# Patient Record
Sex: MALE | Race: WHITE | NOT HISPANIC OR LATINO | URBAN - METROPOLITAN AREA
[De-identification: names, ages, dates, MRNs, and addresses within clinical notes are randomized per-mention and may not be internally consistent; named-entity substitution may affect disease eponyms.]

---

## 2017-12-02 ENCOUNTER — INPATIENT (INPATIENT)
Facility: HOSPITAL | Age: 82
LOS: 2 days | Discharge: HOME | DRG: 375 | End: 2017-12-05
Attending: INTERNAL MEDICINE | Admitting: HOSPITALIST
Payer: MEDICARE

## 2017-12-02 VITALS
HEART RATE: 76 BPM | TEMPERATURE: 99 F | DIASTOLIC BLOOD PRESSURE: 78 MMHG | RESPIRATION RATE: 15 BRPM | SYSTOLIC BLOOD PRESSURE: 128 MMHG | OXYGEN SATURATION: 96 %

## 2017-12-02 DIAGNOSIS — F10.20 ALCOHOL DEPENDENCE, UNCOMPLICATED: ICD-10-CM

## 2017-12-02 DIAGNOSIS — F41.9 ANXIETY DISORDER, UNSPECIFIED: ICD-10-CM

## 2017-12-02 DIAGNOSIS — N17.9 ACUTE KIDNEY FAILURE, UNSPECIFIED: ICD-10-CM

## 2017-12-02 DIAGNOSIS — N40.0 BENIGN PROSTATIC HYPERPLASIA WITHOUT LOWER URINARY TRACT SYMPTOMS: ICD-10-CM

## 2017-12-02 DIAGNOSIS — D64.9 ANEMIA, UNSPECIFIED: ICD-10-CM

## 2017-12-02 DIAGNOSIS — I25.10 ATHEROSCLEROTIC HEART DISEASE OF NATIVE CORONARY ARTERY WITHOUT ANGINA PECTORIS: ICD-10-CM

## 2017-12-02 DIAGNOSIS — Z29.9 ENCOUNTER FOR PROPHYLACTIC MEASURES, UNSPECIFIED: ICD-10-CM

## 2017-12-02 DIAGNOSIS — I10 ESSENTIAL (PRIMARY) HYPERTENSION: ICD-10-CM

## 2017-12-02 DIAGNOSIS — E78.5 HYPERLIPIDEMIA, UNSPECIFIED: ICD-10-CM

## 2017-12-02 DIAGNOSIS — D72.829 ELEVATED WHITE BLOOD CELL COUNT, UNSPECIFIED: ICD-10-CM

## 2017-12-02 LAB
ABO RH CONFIRMATION: SIGNIFICANT CHANGE UP
ALBUMIN SERPL ELPH-MCNC: 3.2 G/DL — LOW (ref 3.3–5)
ALP SERPL-CCNC: 52 U/L — SIGNIFICANT CHANGE UP (ref 40–120)
ALT FLD-CCNC: 17 U/L — SIGNIFICANT CHANGE UP (ref 12–78)
ANION GAP SERPL CALC-SCNC: 10 MMOL/L — SIGNIFICANT CHANGE UP (ref 5–17)
APPEARANCE UR: CLEAR — SIGNIFICANT CHANGE UP
APTT BLD: 25.3 SEC — LOW (ref 27.5–37.4)
AST SERPL-CCNC: 33 U/L — SIGNIFICANT CHANGE UP (ref 15–37)
BILIRUB SERPL-MCNC: 0.3 MG/DL — SIGNIFICANT CHANGE UP (ref 0.2–1.2)
BILIRUB UR-MCNC: ABNORMAL
BUN SERPL-MCNC: 76 MG/DL — HIGH (ref 7–23)
CALCIUM SERPL-MCNC: 8.6 MG/DL — SIGNIFICANT CHANGE UP (ref 8.5–10.1)
CHLORIDE SERPL-SCNC: 104 MMOL/L — SIGNIFICANT CHANGE UP (ref 96–108)
CO2 SERPL-SCNC: 23 MMOL/L — SIGNIFICANT CHANGE UP (ref 22–31)
COLOR SPEC: YELLOW — SIGNIFICANT CHANGE UP
CREAT SERPL-MCNC: 2.7 MG/DL — HIGH (ref 0.5–1.3)
DIFF PNL FLD: ABNORMAL
GLUCOSE SERPL-MCNC: 108 MG/DL — HIGH (ref 70–99)
GLUCOSE UR QL: NEGATIVE — SIGNIFICANT CHANGE UP
HCT VFR BLD CALC: 22.3 % — LOW (ref 39–50)
HGB BLD-MCNC: 6.9 G/DL — CRITICAL LOW (ref 13–17)
INR BLD: 1.05 RATIO — SIGNIFICANT CHANGE UP (ref 0.88–1.16)
KETONES UR-MCNC: NEGATIVE — SIGNIFICANT CHANGE UP
LEUKOCYTE ESTERASE UR-ACNC: NEGATIVE — SIGNIFICANT CHANGE UP
LYMPHOCYTES # BLD AUTO: 14 % — SIGNIFICANT CHANGE UP (ref 13–44)
MCHC RBC-ENTMCNC: 30.5 PG — SIGNIFICANT CHANGE UP (ref 27–34)
MCHC RBC-ENTMCNC: 31 GM/DL — LOW (ref 32–36)
MCV RBC AUTO: 98.3 FL — SIGNIFICANT CHANGE UP (ref 80–100)
MONOCYTES NFR BLD AUTO: 9 % — SIGNIFICANT CHANGE UP (ref 1–9)
NEUTROPHILS NFR BLD AUTO: 77 % — SIGNIFICANT CHANGE UP (ref 43–77)
NITRITE UR-MCNC: NEGATIVE — SIGNIFICANT CHANGE UP
OB PNL STL: POSITIVE
PH UR: 5 — SIGNIFICANT CHANGE UP (ref 5–8)
PLATELET # BLD AUTO: 232 K/UL — SIGNIFICANT CHANGE UP (ref 150–400)
POTASSIUM SERPL-MCNC: 4.1 MMOL/L — SIGNIFICANT CHANGE UP (ref 3.5–5.3)
POTASSIUM SERPL-SCNC: 4.1 MMOL/L — SIGNIFICANT CHANGE UP (ref 3.5–5.3)
PROT SERPL-MCNC: 6.4 G/DL — SIGNIFICANT CHANGE UP (ref 6–8.3)
PROT UR-MCNC: 25 MG/DL
PROTHROM AB SERPL-ACNC: 11.5 SEC — SIGNIFICANT CHANGE UP (ref 9.8–12.7)
RBC # BLD: 2.27 M/UL — LOW (ref 4.2–5.8)
RBC # FLD: 17.5 % — HIGH (ref 10.3–14.5)
SODIUM SERPL-SCNC: 137 MMOL/L — SIGNIFICANT CHANGE UP (ref 135–145)
SP GR SPEC: 1.02 — SIGNIFICANT CHANGE UP (ref 1.01–1.02)
UROBILINOGEN FLD QL: NEGATIVE — SIGNIFICANT CHANGE UP
WBC # BLD: 14.4 K/UL — HIGH (ref 3.8–10.5)
WBC # FLD AUTO: 14.4 K/UL — HIGH (ref 3.8–10.5)

## 2017-12-02 PROCEDURE — 70450 CT HEAD/BRAIN W/O DYE: CPT | Mod: 26

## 2017-12-02 PROCEDURE — 99223 1ST HOSP IP/OBS HIGH 75: CPT | Mod: AI,GC

## 2017-12-02 PROCEDURE — 99285 EMERGENCY DEPT VISIT HI MDM: CPT

## 2017-12-02 PROCEDURE — 93010 ELECTROCARDIOGRAM REPORT: CPT

## 2017-12-02 PROCEDURE — 74176 CT ABD & PELVIS W/O CONTRAST: CPT | Mod: 26

## 2017-12-02 PROCEDURE — 71010: CPT | Mod: 26

## 2017-12-02 RX ORDER — FERROUS SULFATE 325(65) MG
0 TABLET ORAL
Qty: 0 | Refills: 0 | COMMUNITY

## 2017-12-02 RX ORDER — PREGABALIN 225 MG/1
1 CAPSULE ORAL
Qty: 0 | Refills: 0 | COMMUNITY

## 2017-12-02 RX ORDER — FERROUS SULFATE 325(65) MG
1 TABLET ORAL
Qty: 0 | Refills: 0 | COMMUNITY

## 2017-12-02 RX ORDER — DOXAZOSIN MESYLATE 4 MG
4 TABLET ORAL AT BEDTIME
Qty: 0 | Refills: 0 | Status: DISCONTINUED | OUTPATIENT
Start: 2017-12-02 | End: 2017-12-05

## 2017-12-02 RX ORDER — MIRTAZAPINE 45 MG/1
1 TABLET, ORALLY DISINTEGRATING ORAL
Qty: 0 | Refills: 0 | COMMUNITY

## 2017-12-02 RX ORDER — FERROUS SULFATE 325(65) MG
325 TABLET ORAL DAILY
Qty: 0 | Refills: 0 | Status: DISCONTINUED | OUTPATIENT
Start: 2017-12-02 | End: 2017-12-05

## 2017-12-02 RX ORDER — PREGABALIN 225 MG/1
100 CAPSULE ORAL DAILY
Qty: 0 | Refills: 0 | Status: DISCONTINUED | OUTPATIENT
Start: 2017-12-02 | End: 2017-12-05

## 2017-12-02 RX ORDER — LISINOPRIL 2.5 MG/1
1 TABLET ORAL
Qty: 0 | Refills: 0 | COMMUNITY

## 2017-12-02 RX ORDER — SIMVASTATIN 20 MG/1
20 TABLET, FILM COATED ORAL AT BEDTIME
Qty: 0 | Refills: 0 | Status: DISCONTINUED | OUTPATIENT
Start: 2017-12-02 | End: 2017-12-05

## 2017-12-02 RX ORDER — CHOLECALCIFEROL (VITAMIN D3) 125 MCG
1 CAPSULE ORAL
Qty: 0 | Refills: 0 | COMMUNITY

## 2017-12-02 RX ORDER — MIRTAZAPINE 45 MG/1
0 TABLET, ORALLY DISINTEGRATING ORAL
Qty: 0 | Refills: 0 | COMMUNITY

## 2017-12-02 RX ORDER — SODIUM CHLORIDE 9 MG/ML
1000 INJECTION INTRAMUSCULAR; INTRAVENOUS; SUBCUTANEOUS ONCE
Qty: 0 | Refills: 0 | Status: COMPLETED | OUTPATIENT
Start: 2017-12-02 | End: 2017-12-02

## 2017-12-02 RX ORDER — MIRTAZAPINE 45 MG/1
15 TABLET, ORALLY DISINTEGRATING ORAL AT BEDTIME
Qty: 0 | Refills: 0 | Status: DISCONTINUED | OUTPATIENT
Start: 2017-12-02 | End: 2017-12-05

## 2017-12-02 RX ORDER — CHOLECALCIFEROL (VITAMIN D3) 125 MCG
400 CAPSULE ORAL DAILY
Qty: 0 | Refills: 0 | Status: DISCONTINUED | OUTPATIENT
Start: 2017-12-02 | End: 2017-12-05

## 2017-12-02 RX ADMIN — Medication 4 MILLIGRAM(S): at 22:43

## 2017-12-02 RX ADMIN — SIMVASTATIN 20 MILLIGRAM(S): 20 TABLET, FILM COATED ORAL at 22:43

## 2017-12-02 RX ADMIN — MIRTAZAPINE 15 MILLIGRAM(S): 45 TABLET, ORALLY DISINTEGRATING ORAL at 22:43

## 2017-12-02 RX ADMIN — SODIUM CHLORIDE 1000 MILLILITER(S): 9 INJECTION INTRAMUSCULAR; INTRAVENOUS; SUBCUTANEOUS at 17:11

## 2017-12-02 NOTE — H&P ADULT - PROBLEM SELECTOR PLAN 2
Possibly secondary to UTI vs prostatitis, patients son admits to patient with increased urinary frequency and a history of prostatitis  Patient is afebrile, UA negative.  Will monitor WBC off antibiotics.  Consider antibiotics if patient spikes temperature.   Follow up Urine culture and blood cultures x 2. Possibly secondary to UTI vs prostatitis, patients son admits to patient with increased urinary frequency and a history of prostatitis  Patient is afebrile, UA negative.  Will monitor WBC off antibiotics.  Consider antibiotics if patient spikes temperature.   Follow up Urine culture

## 2017-12-02 NOTE — H&P ADULT - PROBLEM SELECTOR PLAN 3
Possibly secondary to dehydration and acute blood loss  s/p 1 L NS in Ed  To receive 2 units PRBC  Will hold ACE inhibitor. Hold renally toxic medications.  Follow up renal function in AM. Possibly secondary to dehydration and acute blood loss  s/p 1 L NS in ED  To receive 2 units PRBC  Will hold ACE inhibitor. Hold renally toxic medications.  Follow up renal function in AM.

## 2017-12-02 NOTE — H&P ADULT - PROBLEM SELECTOR PLAN 8
Will hold chemical AC in the setting of acute GI bleed  SCDs bilaterally for now    IMPROVE VTE Individual Risk Assessment          RISK                                                          Points  [  ] Previous VTE                                                3  [x  ] Thrombophilia                                             2  [  ] Lower limb paralysis                                   2        (unable to hold up >15 seconds)    [  ] Current Cancer                                             2         (within 6 months)  [  ] Immobilization > 24 hrs                              1  [  ] ICU/CCU stay > 24 hours                             1  [  x] Age > 60                                                         1    IMPROVE VTE Score: 3 Continue terazosin

## 2017-12-02 NOTE — H&P ADULT - PROBLEM SELECTOR PLAN 5
Hold ACE inhibitor secondary to WILLARD  Monitor BP off medications for now  Cardiology consult Dr. Millard Hold ACE inhibitor secondary to WILLARD  BP stable. Monitor BP off medications for now  Cardiology consult Dr. Millard

## 2017-12-02 NOTE — H&P ADULT - PROBLEM SELECTOR PLAN 10
Will hold chemical AC in the setting of acute GI bleed  SCDs bilaterally for now    IMPROVE VTE Individual Risk Assessment          RISK                                                          Points  [  ] Previous VTE                                                3  [x  ] Thrombophilia                                             2  [  ] Lower limb paralysis                                   2        (unable to hold up >15 seconds)    [  ] Current Cancer                                             2         (within 6 months)  [  ] Immobilization > 24 hrs                              1  [  ] ICU/CCU stay > 24 hours                             1  [  x] Age > 60                                                         1    IMPROVE VTE Score: 3

## 2017-12-02 NOTE — ED ADULT NURSE NOTE - OBJECTIVE STATEMENT
received pt in bed #9 Pt A&O c/o weakness "feeling shaky" & urine  frequency over the last 2 months. Pts son states he was dx w/ anemia x 1 month & started on iron pills. Pt seen by Dr Ball Pt resting Will monitor received pt in bed #9 Pt A&O confused to time c/o weakness "feeling shaky" & urine  frequency over the last 2 months. Pts son states he was dx w/ anemia x 1 month & started on iron pills. Pt seen by Dr Ball Pt resting Will monitor

## 2017-12-02 NOTE — H&P ADULT - PROBLEM SELECTOR PLAN 1
Anemia likely secondary to Acute GI bleed, with FOBT +  Admit to MED/SURG with remote telemetry  Patient to receive 2 units PRBC  Repeat CBC 1 hour after second unit completed  Follow up AM labs  GI consult Dr. Martínez  Cardiology Consult Dr. Millard  NPO except medications  Strict bedrest  Fall precautions Anemia likely secondary to Acute GI bleed, with FOBT +  Admit to MED/SURG with remote telemetry  Patient to receive 2 units PRBC  Repeat CBC 1 hour after second unit completed  Patient currently hemodynamically stable, potential colonoscopy pending discussion with GI and patients family to observe for source of GI bleed.  Follow up AM labs  GI consult Dr. Martínez  Cardiology Consult Dr. Millard  NPO except medications  Strict bedrest  Fall precautions Anemia likely secondary to Acute GI bleed, with FOBT +  Admit to MED/SURG with remote telemetry  Patient to receive 2 units PRBC  Continue iron, vitamin B12  Repeat CBC 1 hour after second unit completed  Patient currently hemodynamically stable, potential colonoscopy pending discussion with GI and patients family to observe for source of GI bleed.  CT Abdomen/Pelvis without contrast  Follow up AM labs, iron, vitamin B12, folate  GI consult Dr. Martínez  Cardiology Consult Dr. Millard  NPO except medications  Strict bedrest  Fall precautions

## 2017-12-02 NOTE — ED PROVIDER NOTE - OBJECTIVE STATEMENT
89 yo male with hx htn, hld, cad with increasing weakness and lack of energy. +frequent falls. As per son, patient has been deteriorating and unable to take care of himself. Patient recently told he was anemic, and started on iron supplementation. no known GI bleeding. had endoscopy and needs colonoscopy. denies fever or chills. also with urinary frequency.   no hx of tranfusion  pmd Constantino Martínez

## 2017-12-02 NOTE — H&P ADULT - PMH
Anxiety    CAD (coronary artery disease)    Colon cancer    Hyperlipidemia    Hypertension Anxiety    BPH (benign prostatic hyperplasia)    CAD (coronary artery disease)    Colon cancer    Hyperlipidemia    Hypertension

## 2017-12-02 NOTE — ED ADULT NURSE NOTE - PSYCHOSOCIAL WDL
Alert and oriented x 3, normal mood and affect, no apparent risk to self or others. Alert and oriented  normal mood and affect, no apparent risk to self or others.

## 2017-12-02 NOTE — H&P ADULT - PROBLEM SELECTOR PLAN 7
Hold ASA/Plavix secondary to acute GI bleed  Cardiology consult Dr. Millard Hold ASA/Plavix secondary to acute GI bleed  Continue statin  Cardiology consult Dr. Millard

## 2017-12-02 NOTE — H&P ADULT - ASSESSMENT
Patient is a 89 year old male with PMH HTN, HLD, Anemia, CAD s/p 3 stents, history of colon cancer s/p resection 50 years ago (last colonoscopy about 10 years ago), anxiety, presents to the ED with increasing weakness and lack of energy, and +frequent falls, admit for Anemia secondary to acute GI Bleed, Leukocytosis, WILLARD, and Alcoholism. Patient is a 89 year old male with PMH HTN, HLD, Anemia, CAD s/p 3 stents, BPH, history of colon cancer s/p resection 50 years ago (last colonoscopy about 10 years ago), anxiety, presents to the ED with increasing weakness and lack of energy, and +frequent falls, admit for Anemia secondary to acute GI Bleed, Leukocytosis, WILLARD, and Alcoholism.

## 2017-12-02 NOTE — H&P ADULT - HISTORY OF PRESENT ILLNESS
Patient is a 89 year old male with PMH HTN, HLD, Anemia, CAD s/p 3 stents, history of colon cancer s/p resection 50 years ago (last colonoscopy about 10 years ago), anxiety, presents to the ED with increasing weakness and lack of energy, and +frequent falls. As per son, patient has been deteriorating and unable to take care of himself. In the last two months, patient was being worked up for anemia by his PCP and GI Dr. Martínez. He was started on iron, counts improved. He had an endoscopy which was negative. Decision was made by family and doctors to hold off on colonoscopy as patient was improving. Now in the last three days, patient was noted to be weak, confused, not eating, increased frequency of urination, and had an episode of defecating himself without knowing. These symptoms are all new for the patient as per the son. Son reports in the past the patient has had prostatitis. All of history was obtained by the son as patient is confused. Note the son is patients health care proxy and the patient is DNR/DNI as per the son. Patients son also reports that the patient is probably considered an alcoholic, due to daily 1-2 glasses of vodka consumption for many years, this is still current today.    In ED, patients vitals were stable. Labs significant for WBC 14.4, H/H 6.9/22.3, Cr 2.70. FOBT positive. CT Head negative. CXR negative for consolidation or effusion. UA negative. EKG positive for PPM, sinus with fusion complexes, LAD, inferior infarct age indeterminate, anteriolateral infarct age indeterminate, VR 72. Patient received 1 L NS, was type and screened, and was ordered for 2 units PRBC. Patient is a 89 year old male with PMH HTN, HLD, Anemia, CAD s/p 3 stents, BPH, history of colon cancer s/p resection 50 years ago (last colonoscopy about 10 years ago), anxiety, presents to the ED with increasing weakness and lack of energy, and +frequent falls. As per son, patient has been deteriorating and unable to take care of himself. In the last two months, patient was being worked up for anemia by his PCP and GI Dr. Martínez. He was started on iron, counts improved. He had an endoscopy which was negative. Decision was made by family and doctors to hold off on colonoscopy as patient was improving. Now in the last three days, patient was noted to be weak, confused, not eating, increased frequency of urination, and had an episode of defecating himself without knowing. These symptoms are all new for the patient as per the son. Son reports in the past the patient has had prostatitis. All of history was obtained by the son as patient is confused. Note the son is patients health care proxy and the patient is DNR/DNI as per the son. Patients son also reports that the patient is probably considered an alcoholic, due to daily 1-2 glasses of vodka consumption for many years, this is still current today.    In ED, patients vitals were stable. Labs significant for WBC 14.4, H/H 6.9/22.3, Cr 2.70. FOBT positive. CT Head negative. CXR negative for consolidation or effusion. UA negative. EKG positive for PPM, sinus with fusion complexes, LAD, inferior infarct age indeterminate, anteriolateral infarct age indeterminate, VR 72. Patient received 1 L NS, was type and screened, and was ordered for 2 units PRBC.

## 2017-12-02 NOTE — H&P ADULT - ATTENDING COMMENTS
Patient is a 89 year old male with PMH HTN, HLD, Anemia, CAD s/p 3 stents, BPH, history of colon cancer s/p resection 50 years ago (last colonoscopy about 10 years ago), anxiety admit for Anemia secondary to acute GI Bleed, Leukocytosis, WILLARD, and Alcoholism.  2 units ordered.  Monitor on remote tele.  Tele floor was full.  Leukocytosis unclear etiology, no fever, assymptomatic.  F/U urine cx and monitor for fever.  No abx for now.  IVF for willard. CIWA protocol in case pt goes through withdrawal.  CT abd.pelv ordered. Patient is a 89 year old male with PMH HTN, HLD, Anemia, CAD s/p 3 stents, BPH, history of colon cancer s/p resection 50 years ago (last colonoscopy about 10 years ago), anxiety admit for Anemia secondary to acute GI Bleed, Leukocytosis, WILLARD, and Alcoholism.  2 units ordered.  Monitor on remote tele.  Tele floor was full.  Leukocytosis unclear etiology, no fever, assymptomatic.  F/U urine cx and monitor for fever.  No abx for now.  IVF for willard. CIWA protocol in case pt goes through withdrawal.  CT abd.pelv ordered.    After speaking with health proxy the son, he would like patient to be DNR.

## 2017-12-02 NOTE — H&P ADULT - NSHPSOCIALHISTORY_GEN_ALL_CORE
Social History/Preventive Medicine:     Marital Status:   Occupation: retired  Lives with: wife  Ambulates at home: yes    Substance Use: no  Tobacco Usage:  no  Alcohol Usage: daily vodka drinker, 1-2 glasses per day  Illicit Drug Usage: no    Health Management     Last Physical Exam: 3 weeks ago    Advanced Directives: DNR/DNI

## 2017-12-02 NOTE — H&P ADULT - NSHPPHYSICALEXAM_GEN_ALL_CORE
Physical Exam:  General: Well developed, appears thin, smells of urine, NAD  HEENT: NCAT, PERRLA, EOMI bl, moist mucous membranes   Neck: Supple, nontender, no mass  Neurology: Alert to person and place, nonfocal, CN II-XII grossly intact, sensation intact  Respiratory: CTA B/L, No W/R/R  CV: RRR, +S1/S2, no murmurs, rubs or gallops  Abdominal: Soft, NT, + distended abdomen, +BS in RLQ  Extremities: No C/C/E, + peripheral pulses  MSK: Normal ROM, no joint erythema or warmth, no joint swelling   Skin: warm, dry, normal color, no rash or abnormal lesions

## 2017-12-03 DIAGNOSIS — K92.2 GASTROINTESTINAL HEMORRHAGE, UNSPECIFIED: ICD-10-CM

## 2017-12-03 LAB
ALBUMIN SERPL ELPH-MCNC: 2.8 G/DL — LOW (ref 3.3–5)
ALP SERPL-CCNC: 51 U/L — SIGNIFICANT CHANGE UP (ref 40–120)
ALT FLD-CCNC: 18 U/L — SIGNIFICANT CHANGE UP (ref 12–78)
ANION GAP SERPL CALC-SCNC: 8 MMOL/L — SIGNIFICANT CHANGE UP (ref 5–17)
AST SERPL-CCNC: 30 U/L — SIGNIFICANT CHANGE UP (ref 15–37)
BILIRUB SERPL-MCNC: 0.5 MG/DL — SIGNIFICANT CHANGE UP (ref 0.2–1.2)
BUN SERPL-MCNC: 54 MG/DL — HIGH (ref 7–23)
CALCIUM SERPL-MCNC: 8.2 MG/DL — LOW (ref 8.5–10.1)
CHLORIDE SERPL-SCNC: 113 MMOL/L — HIGH (ref 96–108)
CO2 SERPL-SCNC: 23 MMOL/L — SIGNIFICANT CHANGE UP (ref 22–31)
CREAT SERPL-MCNC: 1.6 MG/DL — HIGH (ref 0.5–1.3)
CULTURE RESULTS: SIGNIFICANT CHANGE UP
FOLATE SERPL-MCNC: 11.6 NG/ML — SIGNIFICANT CHANGE UP (ref 4.8–24.2)
GLUCOSE SERPL-MCNC: 94 MG/DL — SIGNIFICANT CHANGE UP (ref 70–99)
HCT VFR BLD CALC: 27.6 % — LOW (ref 39–50)
HCT VFR BLD CALC: 28.5 % — LOW (ref 39–50)
HGB BLD-MCNC: 8.9 G/DL — LOW (ref 13–17)
HGB BLD-MCNC: 8.9 G/DL — LOW (ref 13–17)
IRON SATN MFR SERPL: 13 % — LOW (ref 16–55)
IRON SATN MFR SERPL: 30 UG/DL — LOW (ref 45–165)
MCHC RBC-ENTMCNC: 29.9 PG — SIGNIFICANT CHANGE UP (ref 27–34)
MCHC RBC-ENTMCNC: 31.3 GM/DL — LOW (ref 32–36)
MCV RBC AUTO: 95.4 FL — SIGNIFICANT CHANGE UP (ref 80–100)
PLATELET # BLD AUTO: 214 K/UL — SIGNIFICANT CHANGE UP (ref 150–400)
POTASSIUM SERPL-MCNC: 4.3 MMOL/L — SIGNIFICANT CHANGE UP (ref 3.5–5.3)
POTASSIUM SERPL-SCNC: 4.3 MMOL/L — SIGNIFICANT CHANGE UP (ref 3.5–5.3)
PROT SERPL-MCNC: 5.7 G/DL — LOW (ref 6–8.3)
RBC # BLD: 2.98 M/UL — LOW (ref 4.2–5.8)
RBC # FLD: 17.5 % — HIGH (ref 10.3–14.5)
SODIUM SERPL-SCNC: 144 MMOL/L — SIGNIFICANT CHANGE UP (ref 135–145)
SPECIMEN SOURCE: SIGNIFICANT CHANGE UP
TIBC SERPL-MCNC: 223 UG/DL — SIGNIFICANT CHANGE UP (ref 220–430)
UIBC SERPL-MCNC: 193 UG/DL — SIGNIFICANT CHANGE UP (ref 110–370)
VIT B12 SERPL-MCNC: 1026 PG/ML — HIGH (ref 243–894)
WBC # BLD: 9.2 K/UL — SIGNIFICANT CHANGE UP (ref 3.8–10.5)
WBC # FLD AUTO: 9.2 K/UL — SIGNIFICANT CHANGE UP (ref 3.8–10.5)

## 2017-12-03 PROCEDURE — 99233 SBSQ HOSP IP/OBS HIGH 50: CPT

## 2017-12-03 PROCEDURE — 99223 1ST HOSP IP/OBS HIGH 75: CPT

## 2017-12-03 RX ADMIN — Medication 325 MILLIGRAM(S): at 12:45

## 2017-12-03 RX ADMIN — SIMVASTATIN 20 MILLIGRAM(S): 20 TABLET, FILM COATED ORAL at 21:28

## 2017-12-03 RX ADMIN — PREGABALIN 100 MICROGRAM(S): 225 CAPSULE ORAL at 12:45

## 2017-12-03 RX ADMIN — Medication 4 MILLIGRAM(S): at 21:28

## 2017-12-03 RX ADMIN — Medication 400 UNIT(S): at 12:45

## 2017-12-03 RX ADMIN — Medication 20 MILLIGRAM(S): at 12:45

## 2017-12-03 RX ADMIN — Medication 5 MILLIGRAM(S): at 12:45

## 2017-12-03 RX ADMIN — MIRTAZAPINE 15 MILLIGRAM(S): 45 TABLET, ORALLY DISINTEGRATING ORAL at 21:29

## 2017-12-03 NOTE — CONSULT NOTE ADULT - ASSESSMENT
89 year old male with PMH HTN, HLD, Anemia, CAD s/p 3 stents (unclear when), ppm (unclear which  and reason for implant), BPH, history of colon cancer s/p resection 50 years ago (last colonoscopy about 10 years ago), anxiety.  He presented to the ED with increasing weakness and frequent falls. As per son, patient had been deteriorating and unable to take care of himself. In the last two months, patient was being worked up for anemia by his PCP and GI. He was started on iron, counts improved. He had an endoscopy which was negative. Decision was made by family and doctors to hold off on colonoscopy as patient was improving. Now in the last three days, patient was noted to be weak, confused, not eating, increased frequency of urination, and had an episode of defecating himself without knowing. These symptoms are all new for the patient as per the son. Son reports in the past the patient has had prostatitis.     Patients son also reports that the patient is probably considered an alcoholic, due to daily 1-2 glasses of vodka consumption for many years, which is ongoing.    In ED, patients vitals were stable. Labs significant for WBC 14.4, H/H 6.9/22.3, Cr 2.70. FOBT positive. CT Head negative. CXR negative for consolidation or effusion. UA negative. EKG with paced rhythm.  Was transfused two units overnight.    -there is no evidence of acute ischemia.  -pci in the past, unclear when  -his meds list that he has been taking asa and plavix, suggesting a parminder at some point, perhaps recent  -if the stents were put in very recently he may require dapt despite bleeding	  -ideally would remain on at least asa, though clearly, with life threatening anemia, we need to carefully weigh risks and benefits  -cont statin  -there is no evidence of significant arrhythmia.  - on ekg and tele  -there is no evidence for meaningful  volume overload.  -DVT prophylaxis  -monitor electrolytes, keep k>4, Mg>2  -gi eval  -a conservative approach may be best to manage his gib depending upon information to be obtained from family, his prognosis, and his clinical course  -will follow
Chronic GI Blood loss. Probably from a colonic source. ?Sami, AVM  Family has refused colonoscopy in the past. Will need to update their decision.

## 2017-12-03 NOTE — PROGRESS NOTE ADULT - ASSESSMENT
89 year old male with PMH HTN, HLD, Anemia, CAD s/p 3 stents, BPH, history of colon cancer s/p resection 50 years ago (last colonoscopy about 10 years ago), anxiety, presents to the ED with increasing weakness and lack of energy, and +frequent falls, admit for Anemia secondary to acute GI Bleed, Leukocytosis, WILLARD, and Alcoholism.

## 2017-12-03 NOTE — CONSULT NOTE ADULT - SUBJECTIVE AND OBJECTIVE BOX
[facility]         Carlos Feliz, Samantha, Aaron, Shayna, Freeman, Marcos        [phone] (office)    CHIEF COMPLAINT: Patient is a 89y old  Male who presents with a chief complaint of anemia (02 Dec 2017 19:13)      HPI: The patient is unable to provide a history given his dementia, and his son is unavailable by phone at listed contact number.  History from the medical record.    He is a 89 year old male with PMH HTN, HLD, Anemia, CAD s/p 3 stents (unclear when), ppm (unclear which  and reason for implant), BPH, history of colon cancer s/p resection 50 years ago (last colonoscopy about 10 years ago), anxiety.  He presented to the ED with increasing weakness and frequent falls. As per son, patient had been deteriorating and unable to take care of himself. In the last two months, patient was being worked up for anemia by his PCP and GI. He was started on iron, counts improved. He had an endoscopy which was negative. Decision was made by family and doctors to hold off on colonoscopy as patient was improving. Now in the last three days, patient was noted to be weak, confused, not eating, increased frequency of urination, and had an episode of defecating himself without knowing. These symptoms are all new for the patient as per the son. Son reports in the past the patient has had prostatitis.     Patients son also reports that the patient is probably considered an alcoholic, due to daily 1-2 glasses of vodka consumption for many years, which is ongoing.    In ED, patients vitals were stable. Labs significant for WBC 14.4, H/H 6.9/22.3, Cr 2.70. FOBT positive. CT Head negative. CXR negative for consolidation or effusion. UA negative. EKG with paced rhythm.  Was transfused two units overnight.    Today he is uncertain of his medical history and does not know why he is in the hospital.    PAST MEDICAL & SURGICAL HISTORY:  BPH (benign prostatic hyperplasia)  Anxiety  Colon cancer  CAD (coronary artery disease)  Hyperlipidemia  Hypertension  S/P colon resection  S/P appendectomy  H/O angioplasty: with 3 stents  Pacemaker      SOCIAL HISTORY:  alcohol use as described above    FAMILY HISTORY:  No pertinent family history in first degree relatives      Outpatient medications:    MEDICATIONS  (STANDING):  busPIRone 5 milliGRAM(s) Oral daily  cholecalciferol 400 Unit(s) Oral daily  cyanocobalamin 100 MICROGram(s) Oral daily  doxazosin 4 milliGRAM(s) Oral at bedtime  ferrous    sulfate 325 milliGRAM(s) Oral daily  mirtazapine 15 milliGRAM(s) Oral at bedtime  PARoxetine 20 milliGRAM(s) Oral daily  simvastatin 20 milliGRAM(s) Oral at bedtime    MEDICATIONS  (PRN):      Allergies    sulfa drugs (Headache)    Intolerances        REVIEW OF SYSTEMS: Is negative for eye, ENT, GI, , allergic, dermatologic, musculoskeletal and neurologic, except as described above.    VITAL SIGNS:   Vital Signs Last 24 Hrs  T(C): 36.7 (03 Dec 2017 06:45), Max: 37 (02 Dec 2017 16:08)  T(F): 98 (03 Dec 2017 06:45), Max: 98.6 (02 Dec 2017 16:08)  HR: 70 (03 Dec 2017 06:45) (70 - 76)  BP: 168/73 (03 Dec 2017 06:45) (128/78 - 168/73)  BP(mean): --  RR: 17 (03 Dec 2017 06:45) (15 - 17)  SpO2: 100% (03 Dec 2017 06:45) (96% - 100%)    I&O's Summary    02 Dec 2017 07:01  -  03 Dec 2017 07:00  --------------------------------------------------------  IN: 481 mL / OUT: 850 mL / NET: -369 mL        PHYSICAL EXAM:    Constitutional: NAD, awake and alert, well-developed  Eyes:  EOMI, no oral cyanosis, conjunctivae clear, anicteric.  Pulmonary: Non-labored, breath sounds are clear bilaterally, no wheezing, rales or rhonchi  Cardiovascular:  regular S1 and S2. No murmur.  No rubs, gallops or clicks  Gastrointestinal: Bowel Sounds present, soft, nontender.   Lymph: No peripheral edema.   Neurological: Alert, strength and sensitivity are grossly intact  Skin: No obvious lesions/rashes.   Psych:  Mood & affect appropriate .    LABS: All Labs Reviewed:                        8.9    x     )-----------( x        ( 03 Dec 2017 02:59 )             27.6                         6.9    14.4  )-----------( 232      ( 02 Dec 2017 16:55 )             22.3     02 Dec 2017 16:55    137    |  104    |  76     ----------------------------<  108    4.1     |  23     |  2.70     Ca    8.6        02 Dec 2017 16:55    TPro  6.4    /  Alb  3.2    /  TBili  0.3    /  DBili  x      /  AST  33     /  ALT  17     /  AlkPhos  52     02 Dec 2017 16:55    PT/INR - ( 02 Dec 2017 16:55 )   PT: 11.5 sec;   INR: 1.05 ratio         PTT - ( 02 Dec 2017 16:55 )  PTT:25.3 sec      Blood Culture:         RADIOLOGY:    < from: Xray Chest 1 View AP- PORTABLE-Urgent (12.02.17 @ 17:32) >    EXAM:  PORTABLE CHEST URGENT                            PROCEDURE DATE:  12/02/2017          INTERPRETATION:  History: Weakness    Portable AP radiograph of the chest is performed. There are no prior   studies for comparison.    Carding mediastinalleft-sided dual-lead pacemaker is noted. There is no   infiltrate or pleural effusion. There is osteopenia of the bony   structures.    Impression: Pacemaker. No active pulmonary disease.                JADEN GARCIA M.D.,ATTENDING RADIOLOGIST  This document has been electronically signed. Dec  2 2017  5:42PM                < end of copied text >      EKG: 
Chief Complaint:  Patient is a 89y old  Male who presents with a chief complaint of anemia (02 Dec 2017 19:13)      HPI: 89 yr old man well known to me from the office. Has a history of a malignant polyp s/p colonic resection. Most recently, has been evaluated for anemia. He underwent an EGD which was positive for gastritis, but was otherwise negative. The family has refused a colonoscopy.    He is admitted now with significant anemia. He denies any symptoms suggesting GI blood loss. He denies abdominal pain, nausea, vomiting, weight loss or loss of appetite. There have been no fever or chills.     Allergies:  sulfa drugs (Headache)      Medications:  busPIRone 5 milliGRAM(s) Oral daily  cholecalciferol 400 Unit(s) Oral daily  cyanocobalamin 100 MICROGram(s) Oral daily  doxazosin 4 milliGRAM(s) Oral at bedtime  ferrous    sulfate 325 milliGRAM(s) Oral daily  mirtazapine 15 milliGRAM(s) Oral at bedtime  PARoxetine 20 milliGRAM(s) Oral daily  simvastatin 20 milliGRAM(s) Oral at bedtime      PMHX/PSHX:  BPH (benign prostatic hyperplasia)  Anxiety  Colon cancer  CAD (coronary artery disease)  Hyperlipidemia  Hypertension  S/P colon resection  S/P appendectomy  H/O angioplasty  Pacemaker      Family history:  No pertinent family history in first degree relatives      Social History: No tobacco. 1 glass of vodka daily.    ROS:     General:  No wt loss, fevers, chills, night sweats, fatigue,   Eyes:  Good vision, no reported pain  ENT:  No sore throat, pain, runny nose, dysphagia  CV:  No pain, palpitations, hypo/hypertension  Resp:  No dyspnea, cough, tachypnea, wheezing  :  No pain, bleeding, incontinence, nocturia  Muscle:  No pain, weakness  Neuro:  No weakness, tingling, family has noticede memory problems  Psych:  Long history of anxiety  Endocrine:  No polyuria, polydipsia, cold/heat intolerance  Heme:  No petechiae, ecchymosis, easy bruisability  Skin:  No rash, scars, edema      PHYSICAL EXAM:   Vital Signs:  Vital Signs Last 24 Hrs  T(C): 36.7 (03 Dec 2017 06:45), Max: 37 (02 Dec 2017 16:08)  T(F): 98 (03 Dec 2017 06:45), Max: 98.6 (02 Dec 2017 16:08)  HR: 70 (03 Dec 2017 06:45) (70 - 76)  BP: 168/73 (03 Dec 2017 06:45) (128/78 - 168/73)  BP(mean): --  RR: 17 (03 Dec 2017 06:45) (15 - 17)  SpO2: 100% (03 Dec 2017 06:45) (96% - 100%)  Daily     Daily Weight in k.7 (02 Dec 2017 21:38)    GENERAL:  Appears stated age, well-groomed, well-nourished, no distress  HEENT:  NC/AT,  conjunctivae clear and pink, no thyromegaly, nodules, adenopathy, no JVD, sclera -anicteric  CHEST:  Full & symmetric excursion, no increased effort, breath sounds clear  HEART:  Regular rhythm, S1, S2, no murmur/rub/S3/S4, no abdominal bruit  ABDOMEN:  Soft, non-tender, non-distended, normoactive bowel sounds,  no masses ,no hepato-splenomegaly, no signs of chronic liver disease  EXTEREMITIES:  no cyanosis,clubbing or edema  SKIN:  No rash/erythema/ecchymoses/petechiae/wounds/abscess/warm/dry      LABS:                        8.9    x     )-----------( x        ( 03 Dec 2017 02:59 )             27.6     1202    137  |  104  |  76<H>  ----------------------------<  108<H>  4.1   |  23  |  2.70<H>    Ca    8.6      02 Dec 2017 16:55    TPro  6.4  /  Alb  3.2<L>  /  TBili  0.3  /  DBili  x   /  AST  33  /  ALT  17  /  AlkPhos  52  12-02    LIVER FUNCTIONS - ( 02 Dec 2017 16:55 )  Alb: 3.2 g/dL / Pro: 6.4 g/dL / ALK PHOS: 52 U/L / ALT: 17 U/L / AST: 33 U/L / GGT: x           PT/INR - ( 02 Dec 2017 16:55 )   PT: 11.5 sec;   INR: 1.05 ratio         PTT - ( 02 Dec 2017 16:55 )  PTT:25.3 sec  Urinalysis Basic - ( 02 Dec 2017 17:19 )    Color: Yellow / Appearance: Clear / S.020 / pH: x  Gluc: x / Ketone: Negative  / Bili: Small / Urobili: Negative   Blood: x / Protein: 25 mg/dL / Nitrite: Negative   Leuk Esterase: Negative / RBC: 3-5 /HPF / WBC 0-2   Sq Epi: x / Non Sq Epi: Few / Bacteria: Few          Imaging: Abdominal and pelvic CT reviewed

## 2017-12-04 ENCOUNTER — TRANSCRIPTION ENCOUNTER (OUTPATIENT)
Age: 82
End: 2017-12-04

## 2017-12-04 LAB
ANION GAP SERPL CALC-SCNC: 8 MMOL/L — SIGNIFICANT CHANGE UP (ref 5–17)
BUN SERPL-MCNC: 34 MG/DL — HIGH (ref 7–23)
CALCIUM SERPL-MCNC: 8.1 MG/DL — LOW (ref 8.5–10.1)
CHLORIDE SERPL-SCNC: 111 MMOL/L — HIGH (ref 96–108)
CO2 SERPL-SCNC: 24 MMOL/L — SIGNIFICANT CHANGE UP (ref 22–31)
CREAT SERPL-MCNC: 1.3 MG/DL — SIGNIFICANT CHANGE UP (ref 0.5–1.3)
GLUCOSE SERPL-MCNC: 101 MG/DL — HIGH (ref 70–99)
HCT VFR BLD CALC: 29.5 % — LOW (ref 39–50)
HGB BLD-MCNC: 9.3 G/DL — LOW (ref 13–17)
MCHC RBC-ENTMCNC: 29.9 PG — SIGNIFICANT CHANGE UP (ref 27–34)
MCHC RBC-ENTMCNC: 31.4 GM/DL — LOW (ref 32–36)
MCV RBC AUTO: 95.3 FL — SIGNIFICANT CHANGE UP (ref 80–100)
PLATELET # BLD AUTO: 250 K/UL — SIGNIFICANT CHANGE UP (ref 150–400)
POTASSIUM SERPL-MCNC: 4 MMOL/L — SIGNIFICANT CHANGE UP (ref 3.5–5.3)
POTASSIUM SERPL-SCNC: 4 MMOL/L — SIGNIFICANT CHANGE UP (ref 3.5–5.3)
RBC # BLD: 3.1 M/UL — LOW (ref 4.2–5.8)
RBC # FLD: 17.6 % — HIGH (ref 10.3–14.5)
SODIUM SERPL-SCNC: 143 MMOL/L — SIGNIFICANT CHANGE UP (ref 135–145)
WBC # BLD: 7.9 K/UL — SIGNIFICANT CHANGE UP (ref 3.8–10.5)
WBC # FLD AUTO: 7.9 K/UL — SIGNIFICANT CHANGE UP (ref 3.8–10.5)

## 2017-12-04 PROCEDURE — 99233 SBSQ HOSP IP/OBS HIGH 50: CPT

## 2017-12-04 RX ORDER — SOD SULF/SODIUM/NAHCO3/KCL/PEG
2000 SOLUTION, RECONSTITUTED, ORAL ORAL EVERY 6 HOURS
Qty: 0 | Refills: 0 | Status: COMPLETED | OUTPATIENT
Start: 2017-12-04 | End: 2017-12-05

## 2017-12-04 RX ORDER — SOD SULF/SODIUM/NAHCO3/KCL/PEG
2000 SOLUTION, RECONSTITUTED, ORAL ORAL ONCE
Qty: 0 | Refills: 0 | Status: DISCONTINUED | OUTPATIENT
Start: 2017-12-04 | End: 2017-12-04

## 2017-12-04 RX ADMIN — Medication 4 MILLIGRAM(S): at 21:28

## 2017-12-04 RX ADMIN — Medication 400 UNIT(S): at 12:27

## 2017-12-04 RX ADMIN — Medication 325 MILLIGRAM(S): at 12:27

## 2017-12-04 RX ADMIN — Medication 20 MILLIGRAM(S): at 12:27

## 2017-12-04 RX ADMIN — Medication 2000 MILLILITER(S): at 18:19

## 2017-12-04 RX ADMIN — PREGABALIN 100 MICROGRAM(S): 225 CAPSULE ORAL at 12:27

## 2017-12-04 RX ADMIN — MIRTAZAPINE 15 MILLIGRAM(S): 45 TABLET, ORALLY DISINTEGRATING ORAL at 21:29

## 2017-12-04 RX ADMIN — SIMVASTATIN 20 MILLIGRAM(S): 20 TABLET, FILM COATED ORAL at 22:29

## 2017-12-04 RX ADMIN — Medication 5 MILLIGRAM(S): at 12:27

## 2017-12-04 NOTE — PHYSICAL THERAPY INITIAL EVALUATION ADULT - IMPAIRMENTS CONTRIBUTING TO GAIT DEVIATIONS, PT EVAL
decreased strength/narrow base of support/impaired postural control/impaired balance/cognition/impaired coordination

## 2017-12-04 NOTE — DISCHARGE NOTE ADULT - HOSPITAL COURSE
Patient is a 89 year old male with PMH HTN, HLD, Anemia, CAD s/p 3 stents, BPH, history of colon cancer s/p resection 50 years ago (last colonoscopy about 10 years ago), anxiety, presents to the ED with increasing weakness and lack of energy, and +frequent falls. As per son, patient has been deteriorating and unable to take care of himself. In the last two months, patient was being worked up for anemia by his PCP and GI Dr. Martínez. He was started on iron, counts improved. He had an endoscopy which was negative. Decision was made by family and doctors to hold off on colonoscopy as patient was improving. Now in the last three days, patient was noted to be weak, confused, not eating, increased frequency of urination, and had an episode of defecating himself without knowing. These symptoms are all new for the patient as per the son. Son reports in the past the patient has had prostatitis. All of history was obtained by the son as patient is confused. Note the son is patients health care proxy and the patient is DNR/DNI as per the son. Patients son also reports that the patient is probably considered an alcoholic, due to daily 1-2 glasses of vodka consumption for many years, this is still current today.    In ED, patients vitals were stable. Labs significant for WBC 14.4, H/H 6.9/22.3, Cr 2.70. FOBT positive. CT Head negative. CXR negative for consolidation or effusion. UA negative. EKG positive for PPM, sinus with fusion complexes, LAD, inferior infarct age indeterminate, anteriolateral infarct age indeterminate, VR 72. Patient received 1 L NS, was type and screened, and was ordered for 2 units PRBC.     Patient admitted to medicine with anemia likely due to GIB. Patients aspirin and plavix were held. Patient had endoscopy work up in the past and was evaluated by GI for possible colonoscopy. After discussion with family patient and family agreed to proceed with colonoscopy with GI. Patient is a 89 year old male with PMH HTN, HLD, Anemia, CAD s/p 3 stents, BPH, history of colon cancer s/p resection 50 years ago (last colonoscopy about 10 years ago), anxiety, presents to the ED with increasing weakness and lack of energy, and +frequent falls. As per son, patient has been deteriorating and unable to take care of himself. In the last two months, patient was being worked up for anemia by his PCP and GI Dr. Martínez. He was started on iron, counts improved. He had an endoscopy which was negative. Decision was made by family and doctors to hold off on colonoscopy as patient was improving. Now in the last three days, patient was noted to be weak, confused, not eating, increased frequency of urination, and had an episode of defecating himself without knowing. These symptoms are all new for the patient as per the son. Son reports in the past the patient has had prostatitis. All of history was obtained by the son as patient is confused. Note the son is patients health care proxy and the patient is DNR/DNI as per the son. Patients son also reports that the patient is probably considered an alcoholic, due to daily 1-2 glasses of vodka consumption for many years, this is still current today.    In ED, patients vitals were stable. Labs significant for WBC 14.4, H/H 6.9/22.3, Cr 2.70. FOBT positive. CT Head negative. CXR negative for consolidation or effusion. UA negative. EKG positive for PPM, sinus with fusion complexes, LAD, inferior infarct age indeterminate, anteriolateral infarct age indeterminate, VR 72. Patient received 1 L NS, was type and screened, and was ordered for 2 units PRBC.     Patient admitted to medicine with anemia likely due to GIB. Patients aspirin and plavix were held. Patient had endoscopy work up in the past and was evaluated by GI for possible colonoscopy. After discussion with family patient and family agreed to proceed with colonoscopy with GI. Colonoscopy done showing cecal mass. Patient, son and GI aware  of cecal mass findings, agreed to follow up as outpatient. Patients hemoglobin remain stable after transfusion and holding aspirin and plavix. Spoke to Cardiology Dr. Mccain who agreed to stop aspirin and plavix. Spoke to son Edison [phone] aware of colonoscopy result and discharge planning.  Patient to discharge to home with close outpatient follow up.

## 2017-12-04 NOTE — PROGRESS NOTE ADULT - ASSESSMENT
89 year old male with PMH HTN, HLD, Anemia, CAD s/p 3 stents, BPH, history of colon cancer s/p resection 50 years ago (last colonoscopy about 10 years ago), anxiety, presents to the ED with increasing weakness and lack of energy, and +frequent falls, admit for Anemia secondary to acute GI Bleed, Leukocytosis, WILLARD, and Alcoholism. Patient is at moderate to high risk for low intermediate risk procedure, medically optimized for colonoscopy awaiting also cardiology clearance. Patient with [facility] [facility] ICD interrogated June as per outpatient cardiologist Dr. Vin Mccain. 89 year old male with PMH HTN, HLD, Anemia, CAD s/p 3 stents, BPH, history of colon cancer s/p resection 50 years ago (last colonoscopy about 10 years ago), anxiety, presents to the ED with increasing weakness and lack of energy, and +frequent falls, admit for Anemia secondary to acute GI Bleed, Leukocytosis, WILLARD, and Alcoholism. Patient is at moderate to high risk for low intermediate risk procedure, medically optimized for colonoscopy awaiting also cardiology clearance. Patient with [facility] [facility] pacemaker interrogated June 2017 as per outpatient cardiologist Dr. Vin Mccain.

## 2017-12-04 NOTE — DISCHARGE NOTE ADULT - MEDICATION SUMMARY - MEDICATIONS TO TAKE
I will START or STAY ON the medications listed below when I get home from the hospital:    lisinopril 20 mg oral tablet  -- 1 tab(s) by mouth 2 times a day  -- Indication: For Essential hypertension    terazosin 5 mg oral tablet  --  by mouth once a day  -- Indication: For BPH (benign prostatic hyperplasia)    mirtazapine 15 mg oral tablet  -- 1 tab(s) by mouth once a day (at bedtime)  -- Indication: For Anxiety    PARoxetine 20 mg oral tablet  -- 1 tab(s) by mouth once a day  -- Indication: For Anxiety    simvastatin 20 mg oral tablet  -- 1 tab(s) by mouth once a day (at bedtime)  -- Indication: For Hyperlipidemia    busPIRone 5 mg oral tablet  -- 1 tab(s) by mouth once a day  -- Indication: For Anxiety    ferrous sulfate 75 mg (15 mg elemental iron) oral tablet  -- 1 tab(s) by mouth once a day  -- Indication: For Anemia    Vitamin B-12 100 mcg oral tablet  -- 1 tab(s) by mouth once a day  -- Indication: For Need for prophylactic measure    Vitamin D3 400 intl units oral tablet  -- 1 tab(s) by mouth once a day  -- Indication: For Need for prophylactic measure

## 2017-12-04 NOTE — PHYSICAL THERAPY INITIAL EVALUATION ADULT - GENERAL OBSERVATIONS, REHAB EVAL
Pt rec'd in bed /c NAD or c/o. Pt was agreeable /c PT eval. Pt was noted to have a BM once getting up OOB. Pt did not notices he had soiled himself. Rn aware and in to wash up pt.

## 2017-12-04 NOTE — DISCHARGE NOTE ADULT - CARE PROVIDER_API CALL
Neeraj Meeks (DO), Family Medicine  [address] [place] [address]  Phone: [phone]  Fax: [phone]    Morro Oscar), Gastroenterology; Internal Medicine  [address] [facility]  [address]  [place] [address]  Phone: [phone]  Fax: [phone]    Vin Mccain), Cardiovascular Disease; Internal Medicine  [address] [place] [address]  [address] [place] [address]  Phone: [phone]  Fax: [phone]

## 2017-12-04 NOTE — PHYSICAL THERAPY INITIAL EVALUATION ADULT - TRANSFER SAFETY CONCERNS NOTED: SIT/STAND, REHAB EVAL
postural sway and unsteady/losing balance/decreased balance during turns/decreased safety awareness/decreased sequencing ability/decreased weight-shifting ability/decreased step length

## 2017-12-04 NOTE — PHYSICAL THERAPY INITIAL EVALUATION ADULT - PERTINENT HX OF CURRENT PROBLEM, REHAB EVAL
As per H&P:" 89 y/o male presents to the ED with increasing weakness and lack of energy, and +frequent falls. As per son, patient has been deteriorating and unable to take care of himself. In the last two months, patient was being worked up for anemia by his PCP and GI Dr. Martínez. Now in the last three days, patient was noted to be weak, confused, not eating, increased frequency of urination, and had an episode of defecating himself without knowing. These symptoms are new.

## 2017-12-04 NOTE — PHYSICAL THERAPY INITIAL EVALUATION ADULT - GAIT DEVIATIONS NOTED, PT EVAL
decreased irwin/decreased velocity of limb motion/decreased weight-shifting ability/decreased stride length/decreased step length

## 2017-12-04 NOTE — PROGRESS NOTE ADULT - ASSESSMENT
89 year old male with PMH HTN, HLD, Anemia, CAD s/p 3 stents (unclear when), ppm (unclear which  and reason for implant), BPH, history of colon cancer s/p resection 50 years ago (last colonoscopy about 10 years ago), anxiety.  He presented to the ED with increasing weakness and frequent falls. As per son, patient had been deteriorating and unable to take care of himself. In the last two months, patient was being worked up for anemia by his PCP and GI. He was started on iron, counts improved. He had an endoscopy which was negative. Decision was made by family and doctors to hold off on colonoscopy as patient was improving. Now in the last three days, patient was noted to be weak, confused, not eating, increased frequency of urination, and had an episode of defecating himself without knowing. These symptoms are all new for the patient as per the son. Son reports in the past the patient has had prostatitis.     Patients son also reports that the patient is probably considered an alcoholic, due to daily 1-2 glasses of vodka consumption for many years, which is ongoing.    In ED, patients vitals were stable. Labs significant for WBC 14.4, H/H 6.9/22.3, Cr 2.70. FOBT positive. CT Head negative. CXR negative for consolidation or effusion. UA negative. EKG with paced rhythm.      - there is no evidence of acute ischemia.  - pci reported >1 year. At this point I would hold off on resume plavix.   - If possible I would resume ASA 81mg Qday.   -cont statin  -there is no evidence of significant arrhythmia.  -there is no evidence for meaningful  volume overload.  -DVT prophylaxis  -monitor electrolytes, keep k>4, Mg>2  -gi eval appreciated. In past family has refused colonoscopy.   - Monitor and replete electrolytes. Keep K>4.0 and Mg>2.0.   - Further cardiac workup will depend on clinical course.   - All other workup per primary team. Will followup.

## 2017-12-04 NOTE — DISCHARGE NOTE ADULT - MEDICATION SUMMARY - MEDICATIONS TO STOP TAKING
I will STOP taking the medications listed below when I get home from the hospital:    aspirin 81 mg oral delayed release capsule  --  by mouth once a day    Plavix 75 mg oral tablet  -- 1 tab(s) by mouth once a day

## 2017-12-04 NOTE — DISCHARGE NOTE ADULT - PLAN OF CARE
Patient underwent colonoscopy GI follow up  Stop plavix Continue aspirin  Stop plavix  Follow up with your cardiologist Dr. Mccain as scheduled Continue home medications stable Patient underwent colonoscopy found to have cecal mass Patient underwent colonoscopy found to have cecal mass  Stop aspirin and plavix for now until further work up GI follow up within Dr Oscar  Stop aspirin and plavix Stop aspirin and plavix  Follow up with your cardiologist Dr. Mccain as scheduled Continue home statin medications Continue home blood pressure medications

## 2017-12-04 NOTE — DISCHARGE NOTE ADULT - CARE PLAN
Principal Discharge DX:	Anemia  Goal:	stable  Instructions for follow-up, activity and diet:	Patient underwent colonoscopy  Secondary Diagnosis:	Lower GI bleed  Instructions for follow-up, activity and diet:	GI follow up  Stop plavix  Secondary Diagnosis:	CAD (coronary artery disease)  Instructions for follow-up, activity and diet:	Continue aspirin  Stop plavix  Follow up with your cardiologist Dr. Mccain as scheduled  Secondary Diagnosis:	Hyperlipidemia  Instructions for follow-up, activity and diet:	Continue home medications  Secondary Diagnosis:	Essential hypertension  Instructions for follow-up, activity and diet:	Continue home medications Principal Discharge DX:	Anemia  Goal:	stable  Instructions for follow-up, activity and diet:	Patient underwent colonoscopy found to have cecal mass  Secondary Diagnosis:	Lower GI bleed  Instructions for follow-up, activity and diet:	GI follow up  Stop plavix  Secondary Diagnosis:	CAD (coronary artery disease)  Instructions for follow-up, activity and diet:	Continue aspirin  Stop plavix  Follow up with your cardiologist Dr. Mccain as scheduled  Secondary Diagnosis:	Hyperlipidemia  Instructions for follow-up, activity and diet:	Continue home medications  Secondary Diagnosis:	Essential hypertension  Instructions for follow-up, activity and diet:	Continue home medications Principal Discharge DX:	Anemia  Goal:	stable  Instructions for follow-up, activity and diet:	Patient underwent colonoscopy found to have cecal mass  Stop aspirin and plavix for now until further work up  Secondary Diagnosis:	Lower GI bleed  Instructions for follow-up, activity and diet:	GI follow up within Dr Oscar  Stop aspirin and plavix  Secondary Diagnosis:	CAD (coronary artery disease)  Instructions for follow-up, activity and diet:	Stop aspirin and plavix  Follow up with your cardiologist Dr. Mccain as scheduled  Secondary Diagnosis:	Hyperlipidemia  Instructions for follow-up, activity and diet:	Continue home statin medications  Secondary Diagnosis:	Essential hypertension  Instructions for follow-up, activity and diet:	Continue home blood pressure medications

## 2017-12-04 NOTE — DISCHARGE NOTE ADULT - CARE PROVIDERS DIRECT ADDRESSES
,DirectAddress_Unknown,DirectAddress_Unknown,[email or web address]

## 2017-12-04 NOTE — PROGRESS NOTE ADULT - PROBLEM SELECTOR PLAN 7
Hold ASA/Plavix secondary to acute GI bleed  Continue statin  Cardiology consult Dr. Millard aware of cardiac clearance for colonoscopy, ICD [facility] interrogated June as per outpatient cardiologist Hold ASA/Plavix secondary to acute GI bleed  Continue statin  Cardiology consult Dr. Millard aware of cardiac clearance for colonoscopy, pacemaker [facility] interrogated June as per outpatient cardiologist

## 2017-12-04 NOTE — PHYSICAL THERAPY INITIAL EVALUATION ADULT - ADDITIONAL COMMENTS
History obtained by pt who is noted to have periods of confusion and grand-daughter confirmed history. Pt lives at home in a private house /c 2 DAMON and bilateral rails. Pt has 1 flight of steps inside to his office and spare bedroom /c rails but states he only goes up ~2x/wk. Pt was independent /c all functional mobility and ADLs prior to admission. Pt drives and does not use these adaptive or assistive devices prior to admission. Pt spouse however does use her own rolling walker and or single axis cane. **As per EMR pt has been having difficulties managing himself at home and lately has defecated on himself.

## 2017-12-04 NOTE — PHYSICAL THERAPY INITIAL EVALUATION ADULT - IMPAIRMENTS FOUND, PT EVAL
aerobic capacity/endurance/cognitive impairment/muscle strength/poor safety awareness/posture/joint integrity and mobility/arousal, attention, and cognition/gait, locomotion, and balance

## 2017-12-04 NOTE — PHYSICAL THERAPY INITIAL EVALUATION ADULT - IMPAIRED TRANSFERS: SIT/STAND, REHAB EVAL
decreased ROM/impaired balance/cognition/decreased strength/impaired coordination/impaired postural control

## 2017-12-04 NOTE — DISCHARGE NOTE ADULT - ADDITIONAL INSTRUCTIONS
Follow up with GI Dr Oscar within 1-2 weeks after discharge  Follow up with Colorectal Surgeon at [place] within 1-2 weeks after discharge  Follow up with Dr. Mccain Cardiology

## 2017-12-04 NOTE — PHYSICAL THERAPY INITIAL EVALUATION ADULT - DID THE PATIENT HAVE SURGERY?
CT abdomen/pelvis: (+) sigmoid diverticulosis, prostatomegaly /c probable bladder wall; CT head: (-) acute pathology; chest X-ray: pacemaker/n/a

## 2017-12-05 ENCOUNTER — RESULT REVIEW (OUTPATIENT)
Age: 82
End: 2017-12-05

## 2017-12-05 ENCOUNTER — TRANSCRIPTION ENCOUNTER (OUTPATIENT)
Age: 82
End: 2017-12-05

## 2017-12-05 VITALS
SYSTOLIC BLOOD PRESSURE: 149 MMHG | RESPIRATION RATE: 18 BRPM | DIASTOLIC BLOOD PRESSURE: 70 MMHG | OXYGEN SATURATION: 97 % | TEMPERATURE: 98 F | HEART RATE: 69 BPM

## 2017-12-05 LAB
ANION GAP SERPL CALC-SCNC: 11 MMOL/L — SIGNIFICANT CHANGE UP (ref 5–17)
APTT BLD: 27.8 SEC — SIGNIFICANT CHANGE UP (ref 27.5–37.4)
BUN SERPL-MCNC: 22 MG/DL — SIGNIFICANT CHANGE UP (ref 7–23)
CALCIUM SERPL-MCNC: 8.3 MG/DL — LOW (ref 8.5–10.1)
CHLORIDE SERPL-SCNC: 107 MMOL/L — SIGNIFICANT CHANGE UP (ref 96–108)
CO2 SERPL-SCNC: 25 MMOL/L — SIGNIFICANT CHANGE UP (ref 22–31)
CREAT SERPL-MCNC: 1.2 MG/DL — SIGNIFICANT CHANGE UP (ref 0.5–1.3)
GLUCOSE SERPL-MCNC: 96 MG/DL — SIGNIFICANT CHANGE UP (ref 70–99)
HCT VFR BLD CALC: 30.3 % — LOW (ref 39–50)
HGB BLD-MCNC: 9.6 G/DL — LOW (ref 13–17)
INR BLD: 1.04 RATIO — SIGNIFICANT CHANGE UP (ref 0.88–1.16)
MCHC RBC-ENTMCNC: 30.2 PG — SIGNIFICANT CHANGE UP (ref 27–34)
MCHC RBC-ENTMCNC: 31.8 GM/DL — LOW (ref 32–36)
MCV RBC AUTO: 95.1 FL — SIGNIFICANT CHANGE UP (ref 80–100)
PLATELET # BLD AUTO: 285 K/UL — SIGNIFICANT CHANGE UP (ref 150–400)
POTASSIUM SERPL-MCNC: 3.8 MMOL/L — SIGNIFICANT CHANGE UP (ref 3.5–5.3)
POTASSIUM SERPL-SCNC: 3.8 MMOL/L — SIGNIFICANT CHANGE UP (ref 3.5–5.3)
PROTHROM AB SERPL-ACNC: 11.3 SEC — SIGNIFICANT CHANGE UP (ref 9.8–12.7)
RBC # BLD: 3.19 M/UL — LOW (ref 4.2–5.8)
RBC # FLD: 16.8 % — HIGH (ref 10.3–14.5)
SODIUM SERPL-SCNC: 143 MMOL/L — SIGNIFICANT CHANGE UP (ref 135–145)
WBC # BLD: 7.2 K/UL — SIGNIFICANT CHANGE UP (ref 3.8–10.5)
WBC # FLD AUTO: 7.2 K/UL — SIGNIFICANT CHANGE UP (ref 3.8–10.5)

## 2017-12-05 PROCEDURE — 88305 TISSUE EXAM BY PATHOLOGIST: CPT | Mod: 26

## 2017-12-05 PROCEDURE — 99233 SBSQ HOSP IP/OBS HIGH 50: CPT

## 2017-12-05 PROCEDURE — 99239 HOSP IP/OBS DSCHRG MGMT >30: CPT

## 2017-12-05 RX ADMIN — Medication 5 MILLIGRAM(S): at 11:56

## 2017-12-05 RX ADMIN — Medication 400 UNIT(S): at 11:56

## 2017-12-05 RX ADMIN — PREGABALIN 100 MICROGRAM(S): 225 CAPSULE ORAL at 11:56

## 2017-12-05 RX ADMIN — Medication 325 MILLIGRAM(S): at 11:56

## 2017-12-05 RX ADMIN — Medication 20 MILLIGRAM(S): at 11:56

## 2017-12-05 RX ADMIN — Medication 2000 MILLILITER(S): at 00:00

## 2017-12-05 NOTE — PROGRESS NOTE ADULT - ASSESSMENT
89 year old male with PMH HTN, HLD, Anemia, CAD s/p 3 stents (unclear when), ppm (unclear which  and reason for implant), BPH, history of colon cancer s/p resection 50 years ago (last colonoscopy about 10 years ago), anxiety.  He presented to the ED with increasing weakness and frequent falls. As per son, patient had been deteriorating and unable to take care of himself. In the last two months, patient was being worked up for anemia by his PCP and GI. He was started on iron, counts improved. He had an endoscopy which was negative. Decision was made by family and doctors to hold off on colonoscopy as patient was improving. Now in the last three days, patient was noted to be weak, confused, not eating, increased frequency of urination, and had an episode of defecating himself without knowing. These symptoms are all new for the patient as per the son. Son reports in the past the patient has had prostatitis.     Patients son also reports that the patient is probably considered an alcoholic, due to daily 1-2 glasses of vodka consumption for many years, which is ongoing.    In ED, patients vitals were stable. Labs significant for WBC 14.4, H/H 6.9/22.3, Cr 2.70. FOBT positive. CT Head negative. CXR negative for consolidation or effusion. UA negative. EKG with paced rhythm.  He is s/p colonoscopy:    - there is no evidence of acute ischemia.  - pci reported >1 year. At this point I would hold off on resume plavix  - If possible I would resume ASA 81mg Qday.   -cont statin  -there is no evidence of significant arrhythmia.  -there is no evidence for meaningful  volume overload.  -DVT prophylaxis  -monitor electrolytes, keep k>4, Mg>2  -gi eval appreciated.  Patient tolerated colonoscopy with no cardiac complications.     - Monitor and replete electrolytes. Keep K>4.0 and Mg>2.0.   - Further cardiac workup will depend on clinical course.   - All other workup per primary team. Will followup.

## 2017-12-05 NOTE — PROGRESS NOTE ADULT - PROBLEM SELECTOR PLAN 5
Hold ACE inhibitor secondary to WILLARD  BP stable. Monitor BP off medications for now  Cardiology consult Dr. Millard
Resume ace at home  BP stable.   monitor bmp closely
Hold ACE inhibitor secondary to WILLARD  BP stable. Monitor BP off medications for now  Cardiology consult Dr. Millard

## 2017-12-05 NOTE — PROGRESS NOTE ADULT - PROBLEM SELECTOR PLAN 9
Continue Buspirone, Paroxetine, Mirtazapine

## 2017-12-05 NOTE — PROGRESS NOTE ADULT - PROBLEM SELECTOR PROBLEM 8
BPH (benign prostatic hyperplasia)

## 2017-12-05 NOTE — PROGRESS NOTE ADULT - PROBLEM SELECTOR PLAN 1
+Cecal mass  Continue iron, vitamin B12  CT Abdomen/Pelvis without contrast no acute findings.  GI consult Dr. Martínez  Cardiology Consult Dr. Millard
Anemia likely secondary to Acute GI bleed, with FOBT +  Continue remote telemetry  Patient to receive 2 units PRBC  Continue iron, vitamin B12  Patient currently hemodynamically stable, potential colonoscopy pending discussion with GI and patients family to observe for source of GI bleed.  CT Abdomen/Pelvis without contrast no acute findings.  GI consult Dr. Martínez  Cardiology Consult Dr. Millard  Clear diet for now
Anemia likely secondary to Acute GI bleed, with FOBT +  Colonsocopy in AM prep golytely tonight 2 liters at 6pm and 2 liters at midnight   Continue remote telemetry  Continue iron, vitamin B12  CT Abdomen/Pelvis without contrast no acute findings.  GI consult Dr. Martínez  Cardiology Consult Dr. Freeman ALICEAO

## 2017-12-05 NOTE — PROGRESS NOTE ADULT - PROBLEM SELECTOR PLAN 2
Possibly secondary to UTI vs prostatitis, patients son admits to patient with increased urinary frequency and a history of prostatitis  Patient is afebrile, UA negative.  Will monitor WBC off antibiotics.  Doubt prostatitis   Consider antibiotics if patient spikes temperature or worsening wbc count  Follow up Urine culture
Possibly secondary to UTI vs prostatitis, patients son admits to patient with increased urinary frequency and a history of prostatitis  Patient is afebrile, UA negative.  Will monitor WBC off antibiotics.  Doubt prostatitis   Urine Cx normal catalino
Possibly secondary to UTI vs prostatitis, patients son admits to patient with increased urinary frequency and a history of prostatitis  Patient is afebrile, UA negative.  Will monitor WBC off antibiotics.  Doubt prostatitis   Consider antibiotics if patient spikes temperature or worsening wbc count  Follow up Urine culture

## 2017-12-05 NOTE — PROGRESS NOTE ADULT - PROBLEM SELECTOR PLAN 10
Will holda aspiring and plavix for now in the setting acute bleed  SCDs bilaterally for now    IMPROVE VTE Individual Risk Assessment          RISK                                                          Points  [  ] Previous VTE                                                3  [x  ] Thrombophilia                                             2  [  ] Lower limb paralysis                                   2        (unable to hold up >15 seconds)    [  ] Current Cancer                                             2         (within 6 months)  [  ] Immobilization > 24 hrs                              1  [  ] ICU/CCU stay > 24 hours                             1  [  x] Age > 60                                                         1    IMPROVE VTE Score: 3

## 2017-12-05 NOTE — PROGRESS NOTE ADULT - PROBLEM SELECTOR PLAN 3
Possibly secondary to dehydration and acute blood loss, urinary retention  Monitor BMP closely
Possibly secondary to dehydration and acute blood loss, urinary retention  s/p 1 L NS in ED  Hold ACE inhibitor. Hold renally toxic medications.  Monitor BMP closely
Possibly secondary to dehydration and acute blood loss, urinary retention  s/p 1 L NS in ED  Hold ACE inhibitor. Hold renally toxic medications.  Monitor BMP closely

## 2017-12-05 NOTE — PROGRESS NOTE ADULT - PROBLEM SELECTOR PLAN 6
Continue statin  Cardiology consult Dr. Millard

## 2017-12-05 NOTE — PROGRESS NOTE ADULT - SUBJECTIVE AND OBJECTIVE BOX
89 yo Male comes in with anemia and GIB    INTERVAL HPI/OVERNIGHT EVENTS: Patient seen and examined at bedside. No acute events overnight. Patient had transfusion hemoglobin responded well. Evaluated by GI who recommends possible colonoscopy if family agrees and also stopping aspirin and plavix if warranted.  Spoke to son HCP who agrees to colonoscopy.    Colonoscopy done today, cecal mass noted, son aware and will fu closely outpatient, see dc summary for details     MEDICATIONS  (STANDING):  busPIRone 5 milliGRAM(s) Oral daily  cholecalciferol 400 Unit(s) Oral daily  cyanocobalamin 100 MICROGram(s) Oral daily  doxazosin 4 milliGRAM(s) Oral at bedtime  ferrous    sulfate 325 milliGRAM(s) Oral daily  mirtazapine 15 milliGRAM(s) Oral at bedtime  PARoxetine 20 milliGRAM(s) Oral daily  simvastatin 20 milliGRAM(s) Oral at bedtime    MEDICATIONS  (PRN):        Allergies    sulfa drugs (Headache)    Intolerances        CONSTITUTIONAL: No weakness, fevers or chills  RESPIRATORY: No cough, wheezing, hemoptysis; No shortness of breath  Cvs: No chest pain or palpitations  Gi: No abdominal pain. No nausea, vomiting, diarrhea or constipation. +melena   Neuro: no weakness    All other review of systems is negative unless indicated above.    Vital Signs Last 24 Hrs  T(C): 36.7 (05 Dec 2017 10:00), Max: 37.2 (04 Dec 2017 13:40)  T(F): 98 (05 Dec 2017 10:00), Max: 98.9 (04 Dec 2017 13:40)  HR: 77 (05 Dec 2017 10:00) (68 - 77)  BP: 168/89 (05 Dec 2017 10:00) (128/64 - 177/75)  BP(mean): --  RR: 18 (05 Dec 2017 10:00) (16 - 19)  SpO2: 99% (05 Dec 2017 10:00) (99% - 100%)    General: NAD  Neurology: A&Ox3 , nonfocal  Respiratory: CTA B/L  CV: RRR, S1S2  Abdominal: Soft, NT, ND +BS  Extremities: No edema, + peripheral pulses    LABS:                        9.6    7.2   )-----------( 285      ( 05 Dec 2017 08:40 )             30.3     12-05    143  |  107  |  22  ----------------------------<  96  3.8   |  25  |  1.20    Ca    8.3<L>      05 Dec 2017 08:40      PT/INR - ( 05 Dec 2017 08:40 )   PT: 11.3 sec;   INR: 1.04 ratio         PTT - ( 05 Dec 2017 08:40 )  PTT:27.8 sec      ECG -V paced     RADIOLOGY & ADDITIONAL TESTS:
89 yo Male comes in with anemia and GIB    INTERVAL HPI/OVERNIGHT EVENTS: Patient seen and examined at bedside. No acute events overnight. Patient had transfusion hemoglobin responded well. Evaluated by GI who recommends possible colonoscopy if family agrees and also stopping aspirin and plavix if warranted.  Spoke to son HCP who will discuss with other family members and let team know if proceeding with colonoscopy.     MEDICATIONS  (STANDING):  busPIRone 5 milliGRAM(s) Oral daily  cholecalciferol 400 Unit(s) Oral daily  cyanocobalamin 100 MICROGram(s) Oral daily  doxazosin 4 milliGRAM(s) Oral at bedtime  ferrous    sulfate 325 milliGRAM(s) Oral daily  mirtazapine 15 milliGRAM(s) Oral at bedtime  PARoxetine 20 milliGRAM(s) Oral daily  simvastatin 20 milliGRAM(s) Oral at bedtime    MEDICATIONS  (PRN):      Allergies    sulfa drugs (Headache)    Intolerances        CONSTITUTIONAL: No weakness, fevers or chills  RESPIRATORY: No cough, wheezing, hemoptysis; No shortness of breath  Cvs: No chest pain or palpitations  Gi: No abdominal pain. No nausea, vomiting, diarrhea or constipation. +melena   Neuro: no weakness    All other review of systems is negative unless indicated above.    Vital Signs Last 24 Hrs  T(C): 36.7 (03 Dec 2017 06:45), Max: 37 (02 Dec 2017 16:08)  T(F): 98 (03 Dec 2017 06:45), Max: 98.6 (02 Dec 2017 16:08)  HR: 70 (03 Dec 2017 06:45) (70 - 76)  BP: 168/73 (03 Dec 2017 06:45) (128/78 - 168/73)  BP(mean): --  RR: 17 (03 Dec 2017 06:45) (15 - 17)  SpO2: 100% (03 Dec 2017 06:45) (96% - 100%)    General: NAD  Neurology: A&Ox3 , nonfocal  Respiratory: CTA B/L  CV: RRR, S1S2  Abdominal: Soft, NT, ND +BS  Extremities: No edema, + peripheral pulses      LABS:                        8.9    9.2   )-----------( 214      ( 03 Dec 2017 10:01 )             28.5     12-03    144  |  113<H>  |  54<H>  ----------------------------<  94  4.3   |  23  |  1.60<H>    Ca    8.2<L>      03 Dec 2017 10:01    TPro  5.7<L>  /  Alb  2.8<L>  /  TBili  0.5  /  DBili  x   /  AST  30  /  ALT  18  /  AlkPhos  51  12-03    PT/INR - ( 02 Dec 2017 16:55 )   PT: 11.5 sec;   INR: 1.05 ratio         PTT - ( 02 Dec 2017 16:55 )  PTT:25.3 sec  Urinalysis Basic - ( 02 Dec 2017 17:19 )    Color: Yellow / Appearance: Clear / S.020 / pH: x  Gluc: x / Ketone: Negative  / Bili: Small / Urobili: Negative   Blood: x / Protein: 25 mg/dL / Nitrite: Negative   Leuk Esterase: Negative / RBC: 3-5 /HPF / WBC 0-2   Sq Epi: x / Non Sq Epi: Few / Bacteria: Few        RADIOLOGY & ADDITIONAL TESTS:
[facility] - Carlos Feliz, Samantha, Aaron, Shayna, Marcos Millard  Office Number:  [phone]    Patient resting comfortably in bed in NAD.  Laying flat with no respiratory distress.  No complaints of chest pain, dyspnea, palpitations, PND, or orthopnea.  s/p colonoscopy.  Found to have cecal mass.  Tolerated procedure well.      MEDICATIONS  (STANDING):  busPIRone 5 milliGRAM(s) Oral daily  cholecalciferol 400 Unit(s) Oral daily  cyanocobalamin 100 MICROGram(s) Oral daily  doxazosin 4 milliGRAM(s) Oral at bedtime  ferrous    sulfate 325 milliGRAM(s) Oral daily  mirtazapine 15 milliGRAM(s) Oral at bedtime  PARoxetine 20 milliGRAM(s) Oral daily  simvastatin 20 milliGRAM(s) Oral at bedtime    MEDICATIONS  (PRN):      Allergies    sulfa drugs (Headache)        Vital Signs Last 24 Hrs  T(C): 36.7 (05 Dec 2017 10:00), Max: 36.7 (05 Dec 2017 10:00)  T(F): 98 (05 Dec 2017 10:00), Max: 98 (05 Dec 2017 10:00)  HR: 77 (05 Dec 2017 10:00) (68 - 77)  BP: 168/89 (05 Dec 2017 10:00) (141/67 - 177/75)  BP(mean): --  RR: 18 (05 Dec 2017 10:00) (16 - 18)  SpO2: 99% (05 Dec 2017 10:00) (99% - 100%)    I&O's Summary    05 Dec 2017 07:01  -  05 Dec 2017 14:04  --------------------------------------------------------  IN: 620 mL / OUT: 0 mL / NET: 620 mL        ON EXAM:    Constitutional: NAD, awake and alert, well-developed  HEENT: Moist Mucous Membranes, Anicteric  Pulmonary: Non-labored, breath sounds are clear bilaterally, No wheezing, rales or rhonchi  Cardiovascular: Regular, S1 and S2, No murmurs, rubs, gallops or clicks  Gastrointestinal: Bowel Sounds present, soft, nontender.   Lymph: No peripheral edema. No lymphadenopathy.  Skin: No visible rashes or ulcers.  Psych:  Mood & affect appropriate      LABS: All Labs Reviewed:                        9.6    7.2   )-----------( 285      ( 05 Dec 2017 08:40 )             30.3                         9.3    7.9   )-----------( 250      ( 04 Dec 2017 08:19 )             29.5                         8.9    9.2   )-----------( 214      ( 03 Dec 2017 10:01 )             28.5     05 Dec 2017 08:40    143    |  107    |  22     ----------------------------<  96     3.8     |  25     |  1.20   04 Dec 2017 08:19    143    |  111    |  34     ----------------------------<  101    4.0     |  24     |  1.30   03 Dec 2017 10:01    144    |  113    |  54     ----------------------------<  94     4.3     |  23     |  1.60     Ca    8.3        05 Dec 2017 08:40  Ca    8.1        04 Dec 2017 08:19  Ca    8.2        03 Dec 2017 10:01    TPro  5.7    /  Alb  2.8    /  TBili  0.5    /  DBili  x      /  AST  30     /  ALT  18     /  AlkPhos  51     03 Dec 2017 10:01  TPro  6.4    /  Alb  3.2    /  TBili  0.3    /  DBili  x      /  AST  33     /  ALT  17     /  AlkPhos  52     02 Dec 2017 16:55    PT/INR - ( 05 Dec 2017 08:40 )   PT: 11.3 sec;   INR: 1.04 ratio         PTT - ( 05 Dec 2017 08:40 )  PTT:27.8 sec      Blood Culture: Organism --  Gram Stain Blood -- Gram Stain --  Specimen Source .Urine Clean Catch (Midstream)  Culture-Blood --
[facility] -- Carlos Feliz, Aaron Singh, Freeman Corral Savella  Office # [phone]      Follow Up:  CAD    Subjective/Observations: Patient seen and examined. Events noted. Resting comfortably in bed. No complaints of chest pain, dyspnea, or palpitations reported.       REVIEW OF SYSTEMS: All other review of systems is negative unless indicated above    PAST MEDICAL & SURGICAL HISTORY:  BPH (benign prostatic hyperplasia)  Anxiety  Colon cancer  CAD (coronary artery disease)  Hyperlipidemia  Hypertension  S/P colon resection  S/P appendectomy  H/O angioplasty: with 3 stents  Pacemaker      MEDICATIONS  (STANDING):  busPIRone 5 milliGRAM(s) Oral daily  cholecalciferol 400 Unit(s) Oral daily  cyanocobalamin 100 MICROGram(s) Oral daily  doxazosin 4 milliGRAM(s) Oral at bedtime  ferrous    sulfate 325 milliGRAM(s) Oral daily  mirtazapine 15 milliGRAM(s) Oral at bedtime  PARoxetine 20 milliGRAM(s) Oral daily  simvastatin 20 milliGRAM(s) Oral at bedtime    MEDICATIONS  (PRN):      Allergies    sulfa drugs (Headache)    Intolerances            Vital Signs Last 24 Hrs  T(C): 36.4 (04 Dec 2017 04:56), Max: 37.3 (03 Dec 2017 20:46)  T(F): 97.5 (04 Dec 2017 04:56), Max: 99.1 (03 Dec 2017 20:46)  HR: 72 (04 Dec 2017 04:56) (69 - 72)  BP: 148/76 (04 Dec 2017 04:56) (126/63 - 148/76)  BP(mean): --  RR: 17 (04 Dec 2017 04:56) (17 - 18)  SpO2: 99% (04 Dec 2017 04:56) (98% - 99%)    I&O's Summary    03 Dec 2017 07:01  -  04 Dec 2017 07:00  --------------------------------------------------------  IN: 0 mL / OUT: 2 mL / NET: -2 mL          PHYSICAL EXAM:     Constitutional: NAD, awake and alert, well-developed  HEENT: Moist Mucous Membranes, Anicteric  Pulmonary: Non-labored, breath sounds are clear bilaterally, No wheezing, rales or rhonchi  Cardiovascular: Regular, S1 and S2, No murmurs, rubs, gallops or clicks  Gastrointestinal: Bowel Sounds present, soft, nontender.   Lymph: No peripheral edema. No lymphadenopathy.  Skin: No visible rashes or ulcers.  Psych:  Mood & affect appropriate    LABS: All Labs Reviewed:                        8.9    9.2   )-----------( 214      ( 03 Dec 2017 10:01 )             28.5                         8.9    x     )-----------( x        ( 03 Dec 2017 02:59 )             27.6                         6.9    14.4  )-----------( 232      ( 02 Dec 2017 16:55 )             22.3     03 Dec 2017 10:01    144    |  113    |  54     ----------------------------<  94     4.3     |  23     |  1.60   02 Dec 2017 16:55    137    |  104    |  76     ----------------------------<  108    4.1     |  23     |  2.70     Ca    8.2        03 Dec 2017 10:01  Ca    8.6        02 Dec 2017 16:55    TPro  5.7    /  Alb  2.8    /  TBili  0.5    /  DBili  x      /  AST  30     /  ALT  18     /  AlkPhos  51     03 Dec 2017 10:01  TPro  6.4    /  Alb  3.2    /  TBili  0.3    /  DBili  x      /  AST  33     /  ALT  17     /  AlkPhos  52     02 Dec 2017 16:55    PT/INR - ( 02 Dec 2017 16:55 )   PT: 11.5 sec;   INR: 1.05 ratio         PTT - ( 02 Dec 2017 16:55 )  PTT:25.3 sec
89 yo Male comes in with anemia and GIB    INTERVAL HPI/OVERNIGHT EVENTS: Patient seen and examined at bedside. No acute events overnight. Patient had transfusion hemoglobin responded well. Evaluated by GI who recommends possible colonoscopy if family agrees and also stopping aspirin and plavix if warranted.  Spoke to son HCP who agrees to colonoscopy.    MEDICATIONS  (STANDING):  busPIRone 5 milliGRAM(s) Oral daily  cholecalciferol 400 Unit(s) Oral daily  cyanocobalamin 100 MICROGram(s) Oral daily  doxazosin 4 milliGRAM(s) Oral at bedtime  ferrous    sulfate 325 milliGRAM(s) Oral daily  mirtazapine 15 milliGRAM(s) Oral at bedtime  PARoxetine 20 milliGRAM(s) Oral daily  polyethylene glycol/electrolyte Solution. 2000 milliLiter(s) Oral once  polyethylene glycol/electrolyte Solution. 2000 milliLiter(s) Oral once  simvastatin 20 milliGRAM(s) Oral at bedtime    MEDICATIONS  (PRN):        Allergies    sulfa drugs (Headache)    Intolerances        CONSTITUTIONAL: No weakness, fevers or chills  RESPIRATORY: No cough, wheezing, hemoptysis; No shortness of breath  Cvs: No chest pain or palpitations  Gi: No abdominal pain. No nausea, vomiting, diarrhea or constipation. +melena   Neuro: no weakness    All other review of systems is negative unless indicated above.    Vital Signs Last 24 Hrs  T(C): 36.5 (04 Dec 2017 07:34), Max: 37.3 (03 Dec 2017 20:46)  T(F): 97.7 (04 Dec 2017 07:34), Max: 99.1 (03 Dec 2017 20:46)  HR: 69 (04 Dec 2017 07:34) (69 - 72)  BP: 154/72 (04 Dec 2017 07:34) (126/63 - 154/72)  BP(mean): --  RR: 18 (04 Dec 2017 07:34) (17 - 18)  SpO2: 100% (04 Dec 2017 07:34) (98% - 100%)    General: NAD  Neurology: A&Ox3 , nonfocal  Respiratory: CTA B/L  CV: RRR, S1S2  Abdominal: Soft, NT, ND +BS  Extremities: No edema, + peripheral pulses    LABS:                        9.3    7.9   )-----------( 250      ( 04 Dec 2017 08:19 )             29.5     12-04    143  |  111<H>  |  34<H>  ----------------------------<  101<H>  4.0   |  24  |  1.30    Ca    8.1<L>      04 Dec 2017 08:19    TPro  5.7<L>  /  Alb  2.8<L>  /  TBili  0.5  /  DBili  x   /  AST  30  /  ALT  18  /  AlkPhos  51  12-03    PT/INR - ( 02 Dec 2017 16:55 )   PT: 11.5 sec;   INR: 1.05 ratio         PTT - ( 02 Dec 2017 16:55 )  PTT:25.3 sec  Urinalysis Basic - ( 02 Dec 2017 17:19 )    Color: Yellow / Appearance: Clear / S.020 / pH: x  Gluc: x / Ketone: Negative  / Bili: Small / Urobili: Negative   Blood: x / Protein: 25 mg/dL / Nitrite: Negative   Leuk Esterase: Negative / RBC: 3-5 /HPF / WBC 0-2   Sq Epi: x / Non Sq Epi: Few / Bacteria: Few        Urinalysis Basic - ( 02 Dec 2017 17:19 )    Color: Yellow / Appearance: Clear / S.020 / pH: x  Gluc: x / Ketone: Negative  / Bili: Small / Urobili: Negative   Blood: x / Protein: 25 mg/dL / Nitrite: Negative   Leuk Esterase: Negative / RBC: 3-5 /HPF / WBC 0-2   Sq Epi: x / Non Sq Epi: Few / Bacteria: Few    ECG -V paced     RADIOLOGY & ADDITIONAL TESTS:

## 2017-12-05 NOTE — PROGRESS NOTE ADULT - ASSESSMENT
89 year old male with PMH HTN, HLD, Anemia, CAD s/p 3 stents, BPH, history of colon cancer s/p resection 50 years ago (last colonoscopy about 10 years ago), anxiety, presents to the ED with increasing weakness and lack of energy, and +frequent falls, admit for Anemia secondary to acute GI Bleed, Leukocytosis, WILLARD, and Alcoholism. Patient is at moderate to high risk for low intermediate risk procedure, medically optimized for colonoscopy awaiting also cardiology clearance. Patient with [employer] pacemaker interrogated June 2017 as per outpatient cardiologist Dr. Vin Mccain.

## 2017-12-05 NOTE — PROGRESS NOTE ADULT - PROBLEM SELECTOR PLAN 7
Hold ASA/Plavix secondary to acute GI bleed and cecal mass  Continue statin  Cardiology consult Dr. Millard

## 2017-12-06 LAB — SURGICAL PATHOLOGY FINAL REPORT - CH: SIGNIFICANT CHANGE UP

## 2017-12-19 PROCEDURE — 85027 COMPLETE CBC AUTOMATED: CPT

## 2017-12-19 PROCEDURE — 88305 TISSUE EXAM BY PATHOLOGIST: CPT

## 2017-12-19 PROCEDURE — 97530 THERAPEUTIC ACTIVITIES: CPT

## 2017-12-19 PROCEDURE — 82607 VITAMIN B-12: CPT

## 2017-12-19 PROCEDURE — 86900 BLOOD TYPING SEROLOGIC ABO: CPT

## 2017-12-19 PROCEDURE — 82272 OCCULT BLD FECES 1-3 TESTS: CPT

## 2017-12-19 PROCEDURE — 85730 THROMBOPLASTIN TIME PARTIAL: CPT

## 2017-12-19 PROCEDURE — 70450 CT HEAD/BRAIN W/O DYE: CPT

## 2017-12-19 PROCEDURE — 74176 CT ABD & PELVIS W/O CONTRAST: CPT

## 2017-12-19 PROCEDURE — 85018 HEMOGLOBIN: CPT

## 2017-12-19 PROCEDURE — 83550 IRON BINDING TEST: CPT

## 2017-12-19 PROCEDURE — P9016: CPT

## 2017-12-19 PROCEDURE — 86850 RBC ANTIBODY SCREEN: CPT

## 2017-12-19 PROCEDURE — 80053 COMPREHEN METABOLIC PANEL: CPT

## 2017-12-19 PROCEDURE — 99285 EMERGENCY DEPT VISIT HI MDM: CPT | Mod: 25

## 2017-12-19 PROCEDURE — 80048 BASIC METABOLIC PNL TOTAL CA: CPT

## 2017-12-19 PROCEDURE — 97116 GAIT TRAINING THERAPY: CPT

## 2017-12-19 PROCEDURE — 82962 GLUCOSE BLOOD TEST: CPT

## 2017-12-19 PROCEDURE — 82746 ASSAY OF FOLIC ACID SERUM: CPT

## 2017-12-19 PROCEDURE — 85610 PROTHROMBIN TIME: CPT

## 2017-12-19 PROCEDURE — 86901 BLOOD TYPING SEROLOGIC RH(D): CPT

## 2017-12-19 PROCEDURE — 93005 ELECTROCARDIOGRAM TRACING: CPT

## 2017-12-19 PROCEDURE — 86920 COMPATIBILITY TEST SPIN: CPT

## 2017-12-19 PROCEDURE — 36430 TRANSFUSION BLD/BLD COMPNT: CPT

## 2017-12-19 PROCEDURE — 71045 X-RAY EXAM CHEST 1 VIEW: CPT

## 2017-12-19 PROCEDURE — 97162 PT EVAL MOD COMPLEX 30 MIN: CPT

## 2017-12-19 PROCEDURE — 87086 URINE CULTURE/COLONY COUNT: CPT

## 2017-12-19 PROCEDURE — 81001 URINALYSIS AUTO W/SCOPE: CPT

## 2018-07-16 ENCOUNTER — EMERGENCY (EMERGENCY)
Facility: HOSPITAL | Age: 83
LOS: 1 days | Discharge: HOME | End: 2018-07-16
Attending: EMERGENCY MEDICINE
Payer: MEDICARE

## 2018-07-16 VITALS
SYSTOLIC BLOOD PRESSURE: 126 MMHG | OXYGEN SATURATION: 99 % | TEMPERATURE: 98 F | HEART RATE: 74 BPM | DIASTOLIC BLOOD PRESSURE: 74 MMHG | RESPIRATION RATE: 14 BRPM

## 2018-07-16 VITALS
HEART RATE: 71 BPM | WEIGHT: 149.91 LBS | RESPIRATION RATE: 14 BRPM | HEIGHT: 66 IN | OXYGEN SATURATION: 98 % | SYSTOLIC BLOOD PRESSURE: 169 MMHG | DIASTOLIC BLOOD PRESSURE: 78 MMHG | TEMPERATURE: 98 F

## 2018-07-16 PROBLEM — N40.0 BENIGN PROSTATIC HYPERPLASIA WITHOUT LOWER URINARY TRACT SYMPTOMS: Chronic | Status: ACTIVE | Noted: 2017-12-02

## 2018-07-16 PROBLEM — F41.9 ANXIETY DISORDER, UNSPECIFIED: Chronic | Status: ACTIVE | Noted: 2017-12-02

## 2018-07-16 PROBLEM — C18.9 MALIGNANT NEOPLASM OF COLON, UNSPECIFIED: Chronic | Status: ACTIVE | Noted: 2017-12-02

## 2018-07-16 PROBLEM — I25.10 ATHEROSCLEROTIC HEART DISEASE OF NATIVE CORONARY ARTERY WITHOUT ANGINA PECTORIS: Chronic | Status: ACTIVE | Noted: 2017-12-02

## 2018-07-16 LAB
ALBUMIN SERPL ELPH-MCNC: 3.7 G/DL — SIGNIFICANT CHANGE UP (ref 3.3–5)
ALP SERPL-CCNC: 69 U/L — SIGNIFICANT CHANGE UP (ref 40–120)
ALT FLD-CCNC: 19 U/L — SIGNIFICANT CHANGE UP (ref 12–78)
ANION GAP SERPL CALC-SCNC: 9 MMOL/L — SIGNIFICANT CHANGE UP (ref 5–17)
APTT BLD: 26.3 SEC — LOW (ref 27.5–37.4)
AST SERPL-CCNC: 19 U/L — SIGNIFICANT CHANGE UP (ref 15–37)
BASOPHILS # BLD AUTO: 0.01 K/UL — SIGNIFICANT CHANGE UP (ref 0–0.2)
BASOPHILS NFR BLD AUTO: 0.1 % — SIGNIFICANT CHANGE UP (ref 0–2)
BILIRUB SERPL-MCNC: 0.4 MG/DL — SIGNIFICANT CHANGE UP (ref 0.2–1.2)
BUN SERPL-MCNC: 24 MG/DL — HIGH (ref 7–23)
CALCIUM SERPL-MCNC: 8.8 MG/DL — SIGNIFICANT CHANGE UP (ref 8.5–10.1)
CHLORIDE SERPL-SCNC: 110 MMOL/L — HIGH (ref 96–108)
CK MB BLD-MCNC: 1.3 % — SIGNIFICANT CHANGE UP (ref 0–3.5)
CK MB CFR SERPL CALC: 0.9 NG/ML — SIGNIFICANT CHANGE UP (ref 0–3.6)
CK SERPL-CCNC: 68 U/L — SIGNIFICANT CHANGE UP (ref 26–308)
CO2 SERPL-SCNC: 25 MMOL/L — SIGNIFICANT CHANGE UP (ref 22–31)
CREAT SERPL-MCNC: 1.7 MG/DL — HIGH (ref 0.5–1.3)
EOSINOPHIL # BLD AUTO: 0.03 K/UL — SIGNIFICANT CHANGE UP (ref 0–0.5)
EOSINOPHIL NFR BLD AUTO: 0.4 % — SIGNIFICANT CHANGE UP (ref 0–6)
ETHANOL SERPL-MCNC: <10 MG/DL — SIGNIFICANT CHANGE UP (ref 0–10)
GLUCOSE SERPL-MCNC: 107 MG/DL — HIGH (ref 70–99)
HCT VFR BLD CALC: 36.6 % — LOW (ref 39–50)
HGB BLD-MCNC: 12.1 G/DL — LOW (ref 13–17)
IMM GRANULOCYTES NFR BLD AUTO: 0.2 % — SIGNIFICANT CHANGE UP (ref 0–1.5)
INR BLD: 0.97 RATIO — SIGNIFICANT CHANGE UP (ref 0.88–1.16)
LIDOCAIN IGE QN: 264 U/L — SIGNIFICANT CHANGE UP (ref 73–393)
LYMPHOCYTES # BLD AUTO: 2.23 K/UL — SIGNIFICANT CHANGE UP (ref 1–3.3)
LYMPHOCYTES # BLD AUTO: 26.2 % — SIGNIFICANT CHANGE UP (ref 13–44)
MCHC RBC-ENTMCNC: 32.4 PG — SIGNIFICANT CHANGE UP (ref 27–34)
MCHC RBC-ENTMCNC: 33.1 GM/DL — SIGNIFICANT CHANGE UP (ref 32–36)
MCV RBC AUTO: 98.1 FL — SIGNIFICANT CHANGE UP (ref 80–100)
MONOCYTES # BLD AUTO: 1.07 K/UL — HIGH (ref 0–0.9)
MONOCYTES NFR BLD AUTO: 12.6 % — SIGNIFICANT CHANGE UP (ref 2–14)
NEUTROPHILS # BLD AUTO: 5.16 K/UL — SIGNIFICANT CHANGE UP (ref 1.8–7.4)
NEUTROPHILS NFR BLD AUTO: 60.5 % — SIGNIFICANT CHANGE UP (ref 43–77)
PLATELET # BLD AUTO: 222 K/UL — SIGNIFICANT CHANGE UP (ref 150–400)
POTASSIUM SERPL-MCNC: 4.5 MMOL/L — SIGNIFICANT CHANGE UP (ref 3.5–5.3)
POTASSIUM SERPL-SCNC: 4.5 MMOL/L — SIGNIFICANT CHANGE UP (ref 3.5–5.3)
PROT SERPL-MCNC: 7.3 G/DL — SIGNIFICANT CHANGE UP (ref 6–8.3)
PROTHROM AB SERPL-ACNC: 10.6 SEC — SIGNIFICANT CHANGE UP (ref 9.8–12.7)
RBC # BLD: 3.73 M/UL — LOW (ref 4.2–5.8)
RBC # FLD: 13.6 % — SIGNIFICANT CHANGE UP (ref 10.3–14.5)
SODIUM SERPL-SCNC: 144 MMOL/L — SIGNIFICANT CHANGE UP (ref 135–145)
TROPONIN I SERPL-MCNC: <.015 NG/ML — SIGNIFICANT CHANGE UP (ref 0.01–0.04)
WBC # BLD: 8.52 K/UL — SIGNIFICANT CHANGE UP (ref 3.8–10.5)
WBC # FLD AUTO: 8.52 K/UL — SIGNIFICANT CHANGE UP (ref 3.8–10.5)

## 2018-07-16 PROCEDURE — 70450 CT HEAD/BRAIN W/O DYE: CPT | Mod: 26

## 2018-07-16 PROCEDURE — 82553 CREATINE MB FRACTION: CPT

## 2018-07-16 PROCEDURE — 80307 DRUG TEST PRSMV CHEM ANLYZR: CPT

## 2018-07-16 PROCEDURE — 73140 X-RAY EXAM OF FINGER(S): CPT

## 2018-07-16 PROCEDURE — 85730 THROMBOPLASTIN TIME PARTIAL: CPT

## 2018-07-16 PROCEDURE — 82550 ASSAY OF CK (CPK): CPT

## 2018-07-16 PROCEDURE — 90715 TDAP VACCINE 7 YRS/> IM: CPT

## 2018-07-16 PROCEDURE — 99285 EMERGENCY DEPT VISIT HI MDM: CPT

## 2018-07-16 PROCEDURE — 70450 CT HEAD/BRAIN W/O DYE: CPT

## 2018-07-16 PROCEDURE — 80053 COMPREHEN METABOLIC PANEL: CPT

## 2018-07-16 PROCEDURE — 93010 ELECTROCARDIOGRAM REPORT: CPT

## 2018-07-16 PROCEDURE — 85027 COMPLETE CBC AUTOMATED: CPT

## 2018-07-16 PROCEDURE — 83690 ASSAY OF LIPASE: CPT

## 2018-07-16 PROCEDURE — 73130 X-RAY EXAM OF HAND: CPT | Mod: 26,LT

## 2018-07-16 PROCEDURE — 84484 ASSAY OF TROPONIN QUANT: CPT

## 2018-07-16 PROCEDURE — 93005 ELECTROCARDIOGRAM TRACING: CPT

## 2018-07-16 PROCEDURE — 90471 IMMUNIZATION ADMIN: CPT

## 2018-07-16 PROCEDURE — 99284 EMERGENCY DEPT VISIT MOD MDM: CPT | Mod: 25

## 2018-07-16 PROCEDURE — 73130 X-RAY EXAM OF HAND: CPT

## 2018-07-16 PROCEDURE — 85610 PROTHROMBIN TIME: CPT

## 2018-07-16 RX ORDER — TETANUS TOXOID, REDUCED DIPHTHERIA TOXOID AND ACELLULAR PERTUSSIS VACCINE, ADSORBED 5; 2.5; 8; 8; 2.5 [IU]/.5ML; [IU]/.5ML; UG/.5ML; UG/.5ML; UG/.5ML
0.5 SUSPENSION INTRAMUSCULAR ONCE
Qty: 0 | Refills: 0 | Status: COMPLETED | OUTPATIENT
Start: 2018-07-16 | End: 2018-07-16

## 2018-07-16 RX ADMIN — TETANUS TOXOID, REDUCED DIPHTHERIA TOXOID AND ACELLULAR PERTUSSIS VACCINE, ADSORBED 0.5 MILLILITER(S): 5; 2.5; 8; 8; 2.5 SUSPENSION INTRAMUSCULAR at 09:02

## 2018-07-16 NOTE — ED PROVIDER NOTE - PROGRESS NOTE DETAILS
two rings cut and removed and given back to patient on left 4th digit seen by Irene WATTERS, patient does not want detox, wants to go home Seen by Dr. Singh, awaiting [employer] rep to interrogate device, if no acute events may be discharged, son is aware of plan no events on pacemaker interrogation

## 2018-07-16 NOTE — ED PROVIDER NOTE - CARE PLAN
Principal Discharge DX:	Skin tear of hand without complication, left, initial encounter  Secondary Diagnosis:	Fall, initial encounter

## 2018-07-16 NOTE — CONSULT NOTE ADULT - SUBJECTIVE AND OBJECTIVE BOX
[facility]         Carlos Feliz, Samantha, Aaron, Shayna, Marcos Millard        [phone] (office)    CHIEF COMPLAINT: Patient is a 89y old  Male who presents with a chief complaint of     HPI: 89 year old male with PMH HTN, HLD, Anemia, CAD s/p 3 stents (unclear when), ppm (unclear which  and reason for implant), BPH, history of colon cancer s/p resection 50 years ago (last colonoscopy about 10 years ago), anxiety, EtOH abuse.  He presented to the ED s/p fall. Pt poor historian but admits to daily 1-2 glasses of vodka consumption for many years, which is ongoing. He admits to drinking prior to fall yesterday but denies that being the cause. Denies dizziness, lightheadedness, palpitations, cp, sob, weakness, fever, chills.          PAST MEDICAL & SURGICAL HISTORY:  BPH (benign prostatic hyperplasia)  Anxiety  Colon cancer  CAD (coronary artery disease)  Hyperlipidemia  Hypertension  S/P colon resection  S/P appendectomy  H/O angioplasty: with 3 stents  Pacemaker      SOCIAL HISTORY: No active tobacco, alcohol or illicit drug use    FAMILY HISTORY:  No pertinent family history in first degree relatives      Outpatient medications:    MEDICATIONS  (STANDING):    MEDICATIONS  (PRN):      Allergies    sulfa drugs (Headache)    Intolerances        REVIEW OF SYSTEMS: Is negative for eye, ENT, GI, , allergic, dermatologic, musculoskeletal and neurologic, except as described above.    VITAL SIGNS:   Vital Signs Last 24 Hrs  T(C): 36.9 (16 Jul 2018 10:52), Max: 36.9 (16 Jul 2018 10:52)  T(F): 98.4 (16 Jul 2018 10:52), Max: 98.4 (16 Jul 2018 10:52)  HR: 72 (16 Jul 2018 10:52) (71 - 72)  BP: 137/76 (16 Jul 2018 10:52) (137/76 - 169/78)  BP(mean): --  RR: 16 (16 Jul 2018 10:52) (14 - 16)  SpO2: 97% (16 Jul 2018 10:52) (97% - 98%)    I&O's Summary      PHYSICAL EXAM:    Constitutional: NAD, awake and alert, well-developed  Eyes:  EOMI, no oral cyanosis, conjunctivae clear, anicteric.  Pulmonary: Non-labored, breath sounds are clear bilaterally, no wheezing, rales or rhonchi  Cardiovascular:  regular S1 and S2. No murmur.  No rubs, gallops or clicks  Gastrointestinal: Bowel Sounds present, soft, nontender.   Lymph: No peripheral edema.   Neurological: Alert, strength and sensitivity are grossly intact  Skin: skin abrasion on left arm with mild swellings and erythema   Psych:  Mood & affect appropriate .    LABS: All Labs Reviewed:                        12.1   8.52  )-----------( 222      ( 16 Jul 2018 08:42 )             36.6     16 Jul 2018 08:42    144    |  110    |  24     ----------------------------<  107    4.5     |  25     |  1.70     Ca    8.8        16 Jul 2018 08:42    TPro  7.3    /  Alb  3.7    /  TBili  0.4    /  DBili  x      /  AST  19     /  ALT  19     /  AlkPhos  69     16 Jul 2018 08:42    PT/INR - ( 16 Jul 2018 08:42 )   PT: 10.6 sec;   INR: 0.97 ratio         PTT - ( 16 Jul 2018 08:42 )  PTT:26.3 sec  CARDIAC MARKERS ( 16 Jul 2018 08:42 )  <.015 ng/mL / x     / 68 U/L / x     / 0.9 ng/mL      Blood Culture:         RADIOLOGY:    EKG: afib 80bpm     Impression/Plan:  89 year old male with PMH HTN, HLD, Anemia, CAD s/p 3 stents (unclear when), ppm (unclear which  and reason for implant), BPH, history of colon cancer s/p resection 50 years ago (last colonoscopy about 10 years ago), anxiety, EtOH abuse presented s/p fall at home. Pt is poor historian, admits to falling but has no recollection of what caused to fall. Admits to drinking 1-2 glasses of vodka consumption daily for many years and did have vodka prior to fall but denies that being the cause. Denies dizziness, lightheadedness, palpitations, cp, sob.    -there is no evidence of acute ischemia, Syncope etiology unclear wether this is related to intoxication    -troponin negative x 1   -there is no evidence of significant arrhythmia.  -there is no evidence for meaningful  volume overload.  -AS per previous chart pt had pci in the past >2yrs and was taken off plavix and asa for acute GI bleed   -further cardiac workup will depends on clinical course  -will follow [facility]         Carlos Feliz, Samantha, Aaron, Shayna, Marcos Millard        [phone] (office)    CHIEF COMPLAINT: Patient is a 89y old  Male who presents with a chief complaint of     HPI: 89 year old male with PMH HTN, HLD, Anemia, CAD s/p 3 stents (unclear when), ppm (unclear which  and reason for implant), BPH, history of colon cancer s/p resection 50 years ago (last colonoscopy about 10 years ago), anxiety, EtOH abuse.  He presented to the ED s/p fall. Pt poor historian but admits to daily 1-2 glasses of vodka consumption for many years, which is ongoing. He admits to drinking prior to fall yesterday but denies that being the cause. Denies dizziness, lightheadedness, palpitations, cp, sob, weakness, fever, chills.          PAST MEDICAL & SURGICAL HISTORY:  BPH (benign prostatic hyperplasia)  Anxiety  Colon cancer  CAD (coronary artery disease)  Hyperlipidemia  Hypertension  S/P colon resection  S/P appendectomy  H/O angioplasty: with 3 stents  Pacemaker      SOCIAL HISTORY: No active tobacco, alcohol or illicit drug use    FAMILY HISTORY:  No pertinent family history in first degree relatives      Outpatient medications:    MEDICATIONS  (STANDING):    MEDICATIONS  (PRN):      Allergies    sulfa drugs (Headache)    Intolerances        REVIEW OF SYSTEMS: Is negative for eye, ENT, GI, , allergic, dermatologic, musculoskeletal and neurologic, except as described above.    VITAL SIGNS:   Vital Signs Last 24 Hrs  T(C): 36.9 (16 Jul 2018 10:52), Max: 36.9 (16 Jul 2018 10:52)  T(F): 98.4 (16 Jul 2018 10:52), Max: 98.4 (16 Jul 2018 10:52)  HR: 72 (16 Jul 2018 10:52) (71 - 72)  BP: 137/76 (16 Jul 2018 10:52) (137/76 - 169/78)  BP(mean): --  RR: 16 (16 Jul 2018 10:52) (14 - 16)  SpO2: 97% (16 Jul 2018 10:52) (97% - 98%)    I&O's Summary      PHYSICAL EXAM:    Constitutional: NAD, awake and alert, well-developed  Eyes:  EOMI, no oral cyanosis, conjunctivae clear, anicteric.  Pulmonary: Non-labored, breath sounds are clear bilaterally, no wheezing, rales or rhonchi  Cardiovascular:  regular S1 and S2. No murmur.  No rubs, gallops or clicks  Gastrointestinal: Bowel Sounds present, soft, nontender.   Lymph: No peripheral edema.   Neurological: Alert, strength and sensitivity are grossly intact  Skin: skin abrasion on left arm with mild swellings and erythema   Psych:  Mood & affect appropriate .    LABS: All Labs Reviewed:                        12.1   8.52  )-----------( 222      ( 16 Jul 2018 08:42 )             36.6     16 Jul 2018 08:42    144    |  110    |  24     ----------------------------<  107    4.5     |  25     |  1.70     Ca    8.8        16 Jul 2018 08:42    TPro  7.3    /  Alb  3.7    /  TBili  0.4    /  DBili  x      /  AST  19     /  ALT  19     /  AlkPhos  69     16 Jul 2018 08:42    PT/INR - ( 16 Jul 2018 08:42 )   PT: 10.6 sec;   INR: 0.97 ratio         PTT - ( 16 Jul 2018 08:42 )  PTT:26.3 sec  CARDIAC MARKERS ( 16 Jul 2018 08:42 )  <.015 ng/mL / x     / 68 U/L / x     / 0.9 ng/mL      Blood Culture:         RADIOLOGY:    EKG: afib V-paced     Impression/Plan:  89 year old male with PMH HTN, HLD, Anemia, CAD s/p 3 stents (unclear when), ppm (unclear which  and reason for implant), BPH, history of colon cancer s/p resection 50 years ago (last colonoscopy about 10 years ago), anxiety, EtOH abuse presented s/p fall at home. Pt is poor historian, admits to falling but has no recollection of what caused to fall. Admits to drinking 1-2 glasses of vodka consumption daily for many years and did have vodka prior to fall but denies that being the cause. Denies dizziness, lightheadedness, palpitations, cp, sob.    -there is no evidence of acute ischemia, Syncope etiology unclear wether this is related to intoxication vs pacemaker dysfunction     -troponin negative x 1   -there is no evidence of significant arrhythmia.  -there is no evidence for meaningful  volume overload.  -Will get [facility] to come in for Pacemaker interrogation     -further cardiac workup will depends on clinical course  -will follow  -Discharge home if PP ok with f/u with his cardiologist Dr. Vin Mccain [facility]         Carlos Feliz, Samantha, Aaron, Shayna, Marcos Millard        [phone] (office)    CHIEF COMPLAINT: Patient is a 89y old  Male who presents with a chief complaint of     HPI: 89 year old male with PMH HTN, HLD, Anemia, CAD s/p 3 stents (unclear when), ppm (unclear which  and reason for implant), BPH, history of colon cancer s/p resection 50 years ago (last colonoscopy about 10 years ago), anxiety, EtOH abuse.  He presented to the ED s/p fall. Pt poor historian but admits to daily 1-2 glasses of vodka consumption for many years, which is ongoing. He admits to drinking prior to fall yesterday but denies that being the cause. Denies dizziness, lightheadedness, palpitations, cp, sob, weakness, fever, chills.          PAST MEDICAL & SURGICAL HISTORY:  BPH (benign prostatic hyperplasia)  Anxiety  Colon cancer  CAD (coronary artery disease)  Hyperlipidemia  Hypertension  S/P colon resection  S/P appendectomy  H/O angioplasty: with 3 stents  Pacemaker      SOCIAL HISTORY: No active tobacco, alcohol or illicit drug use    FAMILY HISTORY:  No pertinent family history in first degree relatives      Outpatient medications:    MEDICATIONS  (STANDING):    MEDICATIONS  (PRN):      Allergies    sulfa drugs (Headache)    Intolerances        REVIEW OF SYSTEMS: Is negative for eye, ENT, GI, , allergic, dermatologic, musculoskeletal and neurologic, except as described above.    VITAL SIGNS:   Vital Signs Last 24 Hrs  T(C): 36.9 (16 Jul 2018 10:52), Max: 36.9 (16 Jul 2018 10:52)  T(F): 98.4 (16 Jul 2018 10:52), Max: 98.4 (16 Jul 2018 10:52)  HR: 72 (16 Jul 2018 10:52) (71 - 72)  BP: 137/76 (16 Jul 2018 10:52) (137/76 - 169/78)  BP(mean): --  RR: 16 (16 Jul 2018 10:52) (14 - 16)  SpO2: 97% (16 Jul 2018 10:52) (97% - 98%)    I&O's Summary      PHYSICAL EXAM:    Constitutional: NAD, awake and alert, well-developed  Eyes:  EOMI, no oral cyanosis, conjunctivae clear, anicteric.  Pulmonary: Non-labored, breath sounds are clear bilaterally, no wheezing, rales or rhonchi  Cardiovascular:  regular S1 and S2. No murmur.  No rubs, gallops or clicks  Gastrointestinal: Bowel Sounds present, soft, nontender.   Lymph: No peripheral edema.   Neurological: Alert, strength and sensitivity are grossly intact  Skin: skin abrasion on left arm with mild swellings and erythema   Psych:  Mood & affect appropriate .    LABS: All Labs Reviewed:                        12.1   8.52  )-----------( 222      ( 16 Jul 2018 08:42 )             36.6     16 Jul 2018 08:42    144    |  110    |  24     ----------------------------<  107    4.5     |  25     |  1.70     Ca    8.8        16 Jul 2018 08:42    TPro  7.3    /  Alb  3.7    /  TBili  0.4    /  DBili  x      /  AST  19     /  ALT  19     /  AlkPhos  69     16 Jul 2018 08:42    PT/INR - ( 16 Jul 2018 08:42 )   PT: 10.6 sec;   INR: 0.97 ratio         PTT - ( 16 Jul 2018 08:42 )  PTT:26.3 sec  CARDIAC MARKERS ( 16 Jul 2018 08:42 )  <.015 ng/mL / x     / 68 U/L / x     / 0.9 ng/mL      Blood Culture:         RADIOLOGY:    EKG: afib V-paced

## 2018-07-16 NOTE — ED PROVIDER NOTE - MEDICAL DECISION MAKING DETAILS
bloodwork, ekg, CT, xray's, son requesting patient get SW syncope vs mechanical fall? bloodwork, ekg, CT, xray's, son requesting patient get SW

## 2018-07-16 NOTE — ED PROVIDER NOTE - MUSCULOSKELETAL, MLM
Spine appears normal, range of motion is not limited, no muscle or joint tenderness, +left 4th digit swelling with 2 rings present

## 2018-07-16 NOTE — ED ADULT NURSE NOTE - CHPI ED SYMPTOMS NEG
no weakness/no vomiting/no loss of consciousness/no abrasion/no fever/no numbness/no tingling/no bleeding

## 2018-07-16 NOTE — CONSULT NOTE ADULT - ASSESSMENT
Impression/Plan:  89 year old male with PMH HTN, HLD, Anemia, CAD s/p 3 stents (unclear when), ppm (unclear which  and reason for implant), BPH, history of colon cancer s/p resection 50 years ago (last colonoscopy about 10 years ago), anxiety, EtOH abuse presented s/p fall at home. Pt is poor historian, admits to falling but has no recollection of what caused to fall. Admits to drinking 1-2 glasses of vodka consumption daily for many years and did have vodka prior to fall but denies that being the cause. Denies dizziness, lightheadedness, palpitations, cp, sob.    -there is no evidence of acute ischemia, Syncope etiology unclear wether this is related to intoxication vs pacemaker dysfunction     -troponin negative x 1   -there is no evidence of significant arrhythmia.  -there is no evidence for meaningful  volume overload.  -Will get [facility] to come in for Pacemaker interrogation     -further cardiac workup will depends on clinical course  -will follow  -Discharge home if PP ok with f/u with his cardiologist Dr. Vin Mccain

## 2018-07-16 NOTE — ED PROVIDER NOTE - OBJECTIVE STATEMENT
89 yo male hx of etoh abuse, HLD s/p unwitnessed fall last night, BIBEMS here with son c/o 89 yo male hx of etoh abuse, HLD s/p unwitnessed fall last night, BIBEMS here with son c/o left hand pain/skin tear, left 4th digit swelling 89 yo male hx of etoh abuse, HLD s/p unwitnessed fall last night, BIBEMS here with son c/o left hand pain/skin tear, left 4th digit swelling.  As per son, patient is a chronic vodka drinker.  No chest pain, no shortness of breath.  No other complaints.  Admits that he does not remember how he fell.  Lives at home with wife

## 2018-07-16 NOTE — ED PROVIDER NOTE - PMH
Anxiety    BPH (benign prostatic hyperplasia)    CAD (coronary artery disease)    Colon cancer    Hyperlipidemia    Hypertension

## 2018-07-16 NOTE — ED ADULT NURSE NOTE - OBJECTIVE STATEMENT
Pt arrived in Ed alert and responsive with complaints of multiple skin tears to L arm. L ring finger swollen and rings unable to come off. Pt verbalized he is unaware of what happened. Pt has hx of forgetfulness. Pt in no distress No SOB Or JULY noted. hx of colon tumor. As per EMS pt has had increased amount of etoh intake and possibly the cause of the fall.

## 2018-07-16 NOTE — ED PROVIDER NOTE - SKIN, MLM
+large skin tear on dorsal side of left hand +large skin tear on dorsal side of left hand, no active bleeding

## 2019-02-19 NOTE — DISCHARGE NOTE ADULT - NSFTFADEVICE3FT_GEN_ALL_CORE
[Negative] : Endocrine [FreeTextEntry6] : Hospitalization for aspirations [FreeTextEntry7] : Difficulties swallowing being followed by gastroenterologist [de-identified] : Hypotonic [FreeTextEntry8] : Developmentally delayed; in therapy speech x 4. PT x3 and OT x2 and LILIA therapy 20 hours per week. weakness

## 2020-01-16 ENCOUNTER — EMERGENCY (EMERGENCY)
Facility: HOSPITAL | Age: 85
LOS: 1 days | Discharge: HOME | End: 2020-01-16
Attending: EMERGENCY MEDICINE | Admitting: EMERGENCY MEDICINE
Payer: MEDICARE

## 2020-01-16 VITALS
HEART RATE: 73 BPM | DIASTOLIC BLOOD PRESSURE: 70 MMHG | TEMPERATURE: 97 F | SYSTOLIC BLOOD PRESSURE: 115 MMHG | RESPIRATION RATE: 18 BRPM | OXYGEN SATURATION: 100 %

## 2020-01-16 VITALS
SYSTOLIC BLOOD PRESSURE: 118 MMHG | RESPIRATION RATE: 16 BRPM | DIASTOLIC BLOOD PRESSURE: 62 MMHG | TEMPERATURE: 97 F | HEART RATE: 70 BPM | OXYGEN SATURATION: 100 %

## 2020-01-16 PROCEDURE — 99282 EMERGENCY DEPT VISIT SF MDM: CPT

## 2020-01-16 PROCEDURE — 99283 EMERGENCY DEPT VISIT LOW MDM: CPT

## 2020-01-16 NOTE — ED ADULT NURSE NOTE - NSIMPLEMENTINTERV_GEN_ALL_ED
Implemented All Fall with Harm Risk Interventions:  [place] to call system. Call bell, personal items and telephone within reach. Instruct patient to call for assistance. Room bathroom lighting operational. Non-slip footwear when patient is off stretcher. Physically safe environment: no spills, clutter or unnecessary equipment. Stretcher in lowest position, wheels locked, appropriate side rails in place. Provide visual cue, wrist band, yellow gown, etc. Monitor gait and stability. Monitor for mental status changes and reorient to person, place, and time. Review medications for side effects contributing to fall risk. Reinforce activity limits and safety measures with patient and family. Provide visual clues: red socks.

## 2020-01-16 NOTE — ED PROVIDER NOTE - CARE PROVIDER_API CALL
Bry Sullivan (MD)  Plastic Surgery  [address], [address]  [place] [address]  Phone: [phone]  Fax: [phone]  Follow Up Time:

## 2020-01-16 NOTE — ED PROVIDER NOTE - OBJECTIVE STATEMENT
Pt is a 89 yo male with pmhx of Anxiety BPH CAD Colon cancer Hyperlipidemia Hypertension on home hospice for ca sent in by home hospice nurse for wound on right hand. Pt fell getting out of bed 2 days go no other injuries refused to go to er at that time. Pt wound was seen by hospice nurse today dressed and advised to come to er. Pt denies hitting head loc neck pain chest pain sob abdominal pain fever drainage redness.

## 2020-01-16 NOTE — ED PROVIDER NOTE - ATTENDING CONTRIBUTION TO CARE
I have personally performed a face to face diagnostic evaluation on this patient.  I have reviewed the PA note and agree with the history, exam, and plan of care, except as noted.  History and Exam by me shows  right hand wound x 2 days from home hospice.  pt has no complaints.  right hand- lac about 2 cm,  wound c/d/i c no erythema.  dw pt and daughter regarding PO ab risks and benefits, but we agreed to hold for now.

## 2020-01-16 NOTE — ED PROVIDER NOTE - NSFOLLOWUPINSTRUCTIONS_ED_ALL_ED_FT
Please keep wound clean and covered as directed return for redness fever drainage follow up with hand specialist

## 2020-01-16 NOTE — ED PROVIDER NOTE - CLINICAL SUMMARY MEDICAL DECISION MAKING FREE TEXT BOX
Pt is a 89 yo male with laceration on right hand no signs of infection 2 day old so cannot suture daughter and pt offered prophylaxis abx which they refused advised daily dressing change pt has aide at home advised f/u with hand return for fever redness wound cleaned with saline xeroform and cling placed

## 2020-01-16 NOTE — ED ADULT NURSE NOTE - OBJECTIVE STATEMENT
Pt presents to ED with EMS s/p fall 2 days ago with a wound on his R hand.  Visiting nurse put xeroform dressing with gauze wrap prior to arrival. No bleeding at this time.

## 2020-01-16 NOTE — ED PROVIDER NOTE - PATIENT PORTAL LINK FT
You can access the FollowMyHealth Patient Portal offered by [facility] by registering at the following website: [email or web address]. By joining "[employer]’s FollowMyHealth portal, you will also be able to view your health information using other applications (apps) compatible with our system.

## 2020-01-16 NOTE — ED PROVIDER NOTE - SKIN, MLM
Skin normal color for race, warm, dry right hand triangle flap like laceration about 3 cm  cdi no bleeding no drainage normal warmth nrom

## 2020-02-03 ENCOUNTER — OUTPATIENT (OUTPATIENT)
Dept: OUTPATIENT SERVICES | Facility: HOSPITAL | Age: 85
LOS: 1 days | Discharge: HOME | End: 2020-02-03
Payer: OTHER MISCELLANEOUS

## 2020-02-03 DIAGNOSIS — C18.9 MALIGNANT NEOPLASM OF COLON, UNSPECIFIED: ICD-10-CM

## 2020-02-03 PROCEDURE — 74176 CT ABD & PELVIS W/O CONTRAST: CPT | Mod: 26

## 2024-03-28 NOTE — ED ADULT NURSE NOTE - NS TRANSFER PATIENT BELONGINGS
Prediabetes  -     Hemoglobin A1C; Future  8. Overweight (BMI 25.0-29.9)    All questions and concerns answered. Patient voiced understanding and agrees with POC.    Chronic condition/Plan:  No problem-specific Assessment & Plan notes found for this encounter.       Follow up:  Return in about 6 weeks (around 5/9/2024).      Elements of this note have been dictated using speech recognition software. As a result, errors of speech recognition may have occurred.       VASU BOLTON, DO   Clothing

## 2024-08-14 NOTE — ED ADULT NURSE NOTE - DURATION
I performed a history and physical examination of the patient and discussed management with the resident. I reviewed the resident’s note and agree with the documented findings and plan of care. Any areas of disagreement are noted on the chart. I was personally present for the key portions of any procedures. I have documented in the chart those procedures where I was not present during the key portions. Unless noted in my documentation, I agree with the chief complaint, past medical history, past surgical history, allergies, medications, social and family history as documented. Documentation of the HPI, Physical Exam and Medical Decision Making performed by medical students or scribes is based on my personal performance of the HPI, PE and MDM.   For Phys Assistant/ Nurse Practitioner cases/documentation I have personally evaluated this patient and have completed at least one if not all key elements of the E/M (history, physical exam, and MDM). I find the patient's history and physical exam are consistent with the NP/PA documentation. I agree with the care provided, treatment rendered, disposition and followup plan.   Additional findings are as noted.    Margarito Austin MD  Attending Emergency  Physician        EKG Interpretation    Interpreted by me    Rhythm: normal sinus   Rate: normal  Axis: normal  Ectopy: none  Conduction: Incomplete left bundle  ST Segments: no acute change  T Waves: T wave inversions noted in leads I and aVL  Q Waves: none  LVH  Clinical Impression: no acute changes and no clinically significant morphologic changes noted when compared to prior tracing dated 12 August 2024.    This patient presented to the emergency department with complaints of pressure-like chest discomfort that radiated to her left shoulder and occurred while she was sitting on her bed this afternoon.  She denied any associated shortness of breath, nausea, diaphoresis.  She reports that the symptoms lasted 3 minutes and  month(s)/2